# Patient Record
Sex: FEMALE | Race: WHITE | NOT HISPANIC OR LATINO | Employment: FULL TIME | URBAN - METROPOLITAN AREA
[De-identification: names, ages, dates, MRNs, and addresses within clinical notes are randomized per-mention and may not be internally consistent; named-entity substitution may affect disease eponyms.]

---

## 2017-04-18 PROBLEM — D49.2 NEOPLASM OF UNSPECIFIED BEHAVIOR OF BONE, SOFT TISSUE, AND SKIN: Status: RESOLVED | Noted: 2017-04-04 | Resolved: 2017-04-18

## 2017-04-19 ENCOUNTER — APPOINTMENT (OUTPATIENT)
Dept: RADIOLOGY | Facility: MEDICAL CENTER | Age: 67
End: 2017-04-19
Attending: EMERGENCY MEDICINE
Payer: MEDICARE

## 2017-04-19 ENCOUNTER — RESOLUTE PROFESSIONAL BILLING HOSPITAL PROF FEE (OUTPATIENT)
Dept: HOSPITALIST | Facility: MEDICAL CENTER | Age: 67
End: 2017-04-19
Payer: MEDICARE

## 2017-04-19 ENCOUNTER — HOSPITAL ENCOUNTER (OUTPATIENT)
Facility: MEDICAL CENTER | Age: 67
End: 2017-04-20
Attending: EMERGENCY MEDICINE | Admitting: INTERNAL MEDICINE
Payer: MEDICARE

## 2017-04-19 DIAGNOSIS — R07.9 CHEST PAIN, UNSPECIFIED TYPE: ICD-10-CM

## 2017-04-19 LAB
ALBUMIN SERPL BCP-MCNC: 4.4 G/DL (ref 3.2–4.9)
ALBUMIN/GLOB SERPL: 1.8 G/DL
ALP SERPL-CCNC: 57 U/L (ref 30–99)
ALT SERPL-CCNC: 9 U/L (ref 2–50)
ANION GAP SERPL CALC-SCNC: 7 MMOL/L (ref 0–11.9)
APTT PPP: 28.1 SEC (ref 24.7–36)
AST SERPL-CCNC: 12 U/L (ref 12–45)
BASOPHILS # BLD AUTO: 1.2 % (ref 0–1.8)
BASOPHILS # BLD: 0.06 K/UL (ref 0–0.12)
BILIRUB SERPL-MCNC: 0.3 MG/DL (ref 0.1–1.5)
BNP SERPL-MCNC: 40 PG/ML (ref 0–100)
BUN SERPL-MCNC: 22 MG/DL (ref 8–22)
CALCIUM SERPL-MCNC: 9.8 MG/DL (ref 8.5–10.5)
CHLORIDE SERPL-SCNC: 107 MMOL/L (ref 96–112)
CO2 SERPL-SCNC: 23 MMOL/L (ref 20–33)
CREAT SERPL-MCNC: 0.69 MG/DL (ref 0.5–1.4)
EKG IMPRESSION: NORMAL
EOSINOPHIL # BLD AUTO: 0.09 K/UL (ref 0–0.51)
EOSINOPHIL NFR BLD: 1.9 % (ref 0–6.9)
ERYTHROCYTE [DISTWIDTH] IN BLOOD BY AUTOMATED COUNT: 40.8 FL (ref 35.9–50)
GFR SERPL CREATININE-BSD FRML MDRD: >60 ML/MIN/1.73 M 2
GLOBULIN SER CALC-MCNC: 2.5 G/DL (ref 1.9–3.5)
GLUCOSE SERPL-MCNC: 87 MG/DL (ref 65–99)
HCT VFR BLD AUTO: 42.8 % (ref 37–47)
HGB BLD-MCNC: 14.1 G/DL (ref 12–16)
IMM GRANULOCYTES # BLD AUTO: 0.01 K/UL (ref 0–0.11)
IMM GRANULOCYTES NFR BLD AUTO: 0.2 % (ref 0–0.9)
INR PPP: 0.9 (ref 0.87–1.13)
LIPASE SERPL-CCNC: 31 U/L (ref 11–82)
LYMPHOCYTES # BLD AUTO: 1.28 K/UL (ref 1–4.8)
LYMPHOCYTES NFR BLD: 26.6 % (ref 22–41)
MCH RBC QN AUTO: 28.8 PG (ref 27–33)
MCHC RBC AUTO-ENTMCNC: 32.9 G/DL (ref 33.6–35)
MCV RBC AUTO: 87.3 FL (ref 81.4–97.8)
MONOCYTES # BLD AUTO: 0.32 K/UL (ref 0–0.85)
MONOCYTES NFR BLD AUTO: 6.6 % (ref 0–13.4)
NEUTROPHILS # BLD AUTO: 3.06 K/UL (ref 2–7.15)
NEUTROPHILS NFR BLD: 63.5 % (ref 44–72)
NRBC # BLD AUTO: 0 K/UL
NRBC BLD AUTO-RTO: 0 /100 WBC
PLATELET # BLD AUTO: 190 K/UL (ref 164–446)
PMV BLD AUTO: 10.6 FL (ref 9–12.9)
POTASSIUM SERPL-SCNC: 4.3 MMOL/L (ref 3.6–5.5)
PROT SERPL-MCNC: 6.9 G/DL (ref 6–8.2)
PROTHROMBIN TIME: 12.4 SEC (ref 12–14.6)
RBC # BLD AUTO: 4.9 M/UL (ref 4.2–5.4)
SODIUM SERPL-SCNC: 137 MMOL/L (ref 135–145)
T4 FREE SERPL-MCNC: 1.24 NG/DL (ref 0.53–1.43)
TROPONIN I SERPL-MCNC: <0.01 NG/ML (ref 0–0.04)
TROPONIN I SERPL-MCNC: <0.01 NG/ML (ref 0–0.04)
TSH SERPL DL<=0.005 MIU/L-ACNC: 1.61 UIU/ML (ref 0.3–3.7)
WBC # BLD AUTO: 4.8 K/UL (ref 4.8–10.8)

## 2017-04-19 PROCEDURE — 84443 ASSAY THYROID STIM HORMONE: CPT

## 2017-04-19 PROCEDURE — A9270 NON-COVERED ITEM OR SERVICE: HCPCS | Performed by: INTERNAL MEDICINE

## 2017-04-19 PROCEDURE — 71010 DX-CHEST-LIMITED (1 VIEW): CPT

## 2017-04-19 PROCEDURE — 83880 ASSAY OF NATRIURETIC PEPTIDE: CPT

## 2017-04-19 PROCEDURE — 85730 THROMBOPLASTIN TIME PARTIAL: CPT

## 2017-04-19 PROCEDURE — 93005 ELECTROCARDIOGRAM TRACING: CPT

## 2017-04-19 PROCEDURE — G0378 HOSPITAL OBSERVATION PER HR: HCPCS

## 2017-04-19 PROCEDURE — 99220 PR INITIAL OBSERVATION CARE,LEVL III: CPT | Performed by: INTERNAL MEDICINE

## 2017-04-19 PROCEDURE — 93005 ELECTROCARDIOGRAM TRACING: CPT | Performed by: INTERNAL MEDICINE

## 2017-04-19 PROCEDURE — 80053 COMPREHEN METABOLIC PANEL: CPT

## 2017-04-19 PROCEDURE — 36415 COLL VENOUS BLD VENIPUNCTURE: CPT

## 2017-04-19 PROCEDURE — 84484 ASSAY OF TROPONIN QUANT: CPT

## 2017-04-19 PROCEDURE — 99285 EMERGENCY DEPT VISIT HI MDM: CPT

## 2017-04-19 PROCEDURE — 85610 PROTHROMBIN TIME: CPT

## 2017-04-19 PROCEDURE — 93010 ELECTROCARDIOGRAM REPORT: CPT | Performed by: INTERNAL MEDICINE

## 2017-04-19 PROCEDURE — 84439 ASSAY OF FREE THYROXINE: CPT

## 2017-04-19 PROCEDURE — 85025 COMPLETE CBC W/AUTO DIFF WBC: CPT

## 2017-04-19 PROCEDURE — 700102 HCHG RX REV CODE 250 W/ 637 OVERRIDE(OP): Performed by: INTERNAL MEDICINE

## 2017-04-19 PROCEDURE — 83690 ASSAY OF LIPASE: CPT

## 2017-04-19 RX ORDER — BISACODYL 10 MG
10 SUPPOSITORY, RECTAL RECTAL
Status: DISCONTINUED | OUTPATIENT
Start: 2017-04-19 | End: 2017-04-20 | Stop reason: HOSPADM

## 2017-04-19 RX ORDER — LEVOTHYROXINE SODIUM 0.07 MG/1
75 TABLET ORAL
Status: DISCONTINUED | OUTPATIENT
Start: 2017-04-20 | End: 2017-04-20 | Stop reason: HOSPADM

## 2017-04-19 RX ORDER — NITROGLYCERIN 0.4 MG/1
0.4 TABLET SUBLINGUAL
Status: DISCONTINUED | OUTPATIENT
Start: 2017-04-19 | End: 2017-04-20 | Stop reason: HOSPADM

## 2017-04-19 RX ORDER — POLYETHYLENE GLYCOL 3350 17 G/17G
1 POWDER, FOR SOLUTION ORAL
Status: DISCONTINUED | OUTPATIENT
Start: 2017-04-19 | End: 2017-04-20 | Stop reason: HOSPADM

## 2017-04-19 RX ORDER — AMOXICILLIN 250 MG
2 CAPSULE ORAL 2 TIMES DAILY
Status: DISCONTINUED | OUTPATIENT
Start: 2017-04-19 | End: 2017-04-20 | Stop reason: HOSPADM

## 2017-04-19 RX ORDER — ATORVASTATIN CALCIUM 40 MG/1
40 TABLET, FILM COATED ORAL EVERY EVENING
Status: DISCONTINUED | OUTPATIENT
Start: 2017-04-19 | End: 2017-04-20 | Stop reason: HOSPADM

## 2017-04-19 RX ORDER — ACETAMINOPHEN 325 MG/1
650 TABLET ORAL EVERY 6 HOURS PRN
Status: DISCONTINUED | OUTPATIENT
Start: 2017-04-19 | End: 2017-04-20 | Stop reason: HOSPADM

## 2017-04-19 RX ADMIN — ATORVASTATIN CALCIUM 40 MG: 40 TABLET, FILM COATED ORAL at 20:59

## 2017-04-19 RX ADMIN — METOPROLOL TARTRATE 12.5 MG: 25 TABLET, FILM COATED ORAL at 20:59

## 2017-04-19 ASSESSMENT — LIFESTYLE VARIABLES
CONSUMPTION TOTAL: NEGATIVE
TOTAL SCORE: 0
HOW MANY TIMES IN THE PAST YEAR HAVE YOU HAD 5 OR MORE DRINKS IN A DAY: 0
HAVE PEOPLE ANNOYED YOU BY CRITICIZING YOUR DRINKING: NO
ALCOHOL_USE: YES
AVERAGE NUMBER OF DAYS PER WEEK YOU HAVE A DRINK CONTAINING ALCOHOL: 1
HAVE YOU EVER FELT YOU SHOULD CUT DOWN ON YOUR DRINKING: NO
ON A TYPICAL DAY WHEN YOU DRINK ALCOHOL HOW MANY DRINKS DO YOU HAVE: 1
TOTAL SCORE: 0
EVER FELT BAD OR GUILTY ABOUT YOUR DRINKING: NO
EVER HAD A DRINK FIRST THING IN THE MORNING TO STEADY YOUR NERVES TO GET RID OF A HANGOVER: NO
EVER_SMOKED: YES
TOTAL SCORE: 0

## 2017-04-19 ASSESSMENT — PAIN SCALES - GENERAL
PAINLEVEL_OUTOF10: 0
PAINLEVEL_OUTOF10: 0

## 2017-04-19 NOTE — ED NOTES
.  Chief Complaint   Patient presents with   • Chest Pain     last night 0530 lasting at ot 30 minutes.    • Shortness of Breath   • Numbness     left hand   • Tired   ambulated to triage with daughter. Above c/c. ekg complete prior to triage.

## 2017-04-19 NOTE — IP AVS SNAPSHOT
" Home Care Instructions                                                                                                                  Name:Sinai Hoover  Medical Record Number:[id number]  CSN: [id number]    YOB: 1950   Age: 67 y.o.  Sex: female  HT:1.727 m (5' 8\") WT: 65.7 kg (144 lb 13.5 oz)          Admit Date: 4/19/2017     Discharge Date:   Today's Date: 4/20/2017  Attending Doctor:  Florian Campbell M.D.                  Allergies:  Celebrex            Discharge Instructions       Discharge Instructions    Discharged to home by car with relative. Discharged via walking, hospital escort: Refused.  Special equipment needed: Not Applicable    Be sure to schedule a follow-up appointment with your primary care doctor or any specialists as instructed.     Discharge Plan:   Diet Plan: Discussed  Activity Level: Discussed  Confirmed Follow up Appointment: Patient to Call and Schedule Appointment  Confirmed Symptoms Management: Discussed  Medication Reconciliation Updated: Yes  Influenza Vaccine Indication: Patient Refuses    I understand that a diet low in cholesterol, fat, and sodium is recommended for good health. Unless I have been given specific instructions below for another diet, I accept this instruction as my diet prescription.   Other diet: heart healthy    Special Instructions: None    · Is patient discharged on Warfarin / Coumadin?   No     · Is patient Post Blood Transfusion?  No        Discharge to home.   See Ailyn Jefferson in 1 week.    Heart healthy diet.            Depression / Suicide Risk    As you are discharged from this [facility] facility, it is important to learn how to keep safe from harming yourself.    Recognize the warning signs:  · Abrupt changes in personality, positive or negative- including increase in energy   · Giving away possessions  · Change in eating patterns- significant weight changes-  positive or negative  · Change in sleeping patterns- unable to sleep or sleeping all " the time   · Unwillingness or inability to communicate  · Depression  · Unusual sadness, discouragement and loneliness  · Talk of wanting to die  · Neglect of personal appearance   · Rebelliousness- reckless behavior  · Withdrawal from people/activities they love  · Confusion- inability to concentrate     If you or a loved one observes any of these behaviors or has concerns about self-harm, here's what you can do:  · Talk about it- your feelings and reasons for harming yourself  · Remove any means that you might use to hurt yourself (examples: pills, rope, extension cords, firearm)  · Get professional help from the community (Mental Health, Substance Abuse, psychological counseling)  · Do not be alone:Call your Safe Contact- someone whom you trust who will be there for you.  · Call your local CRISIS HOTLINE 885-7669 or 767-723-3046  · Call your local Children's Mobile Crisis Response Team [place] (707) 609-3587 or www.[employer]  · Call the toll free National Suicide Prevention Hotlines   · National Suicide Prevention Lifeline 639-257-GTMP (2134)  · [place] Hope Line Network 800-SUICIDE (118-8769)           Discharge Medication Instructions:    Below are the medications your physician expects you to take upon discharge:    Review all your home medications and newly ordered medications with your doctor and/or pharmacist. Follow medication instructions as directed by your doctor and/or pharmacist.    Please keep your medication list with you and share with your physician.               Medication List      CHANGE how you take these medications        Instructions    Morning Afternoon Evening Bedtime    levothyroxine 75 MCG Tabs   What changed:  Another medication with the same name was removed. Continue taking this medication, and follow the directions you see here.   Last time this was given:  75 mcg on 4/20/2017  6:00 AM   Commonly known as:  SYNTHROID        TAKE 1 TAB BY MOUTH EVERYDAY.                                   Orders for after discharge     REFERRAL TO INTENSIVE CARDIAC REHAB/CARDIAC REHAB    Complete by:  As directed    Qualifying Diagnosis for Cardiac Rehab:    -Myocardial Infarction  -Old Myocardial Infarction  -Coronary Artery Bypass Surgery  -Stable Angina Pectoris  -PTCA Status with or without Stents  -Heart Valve Replaced by other means  -Heart Transplant   -Aneurysm Repair    Provider Exercise Prescription for Treatment Plan:  -Outpatient Cardiac Rehab (CR)/Intensive Cardiac Rehab (ICR), duration based on patient progress 1-3 times per week up to a total of 36/72 sessions over a period of up to 18/36 weeks    -Progressive interval exercise training for 20-45 minutes, 1-3 times per week in Cardiac Rehab utilizing Treadmill, Elliptical, Stationary Cycling, Arm Ergometer, other conditioning activities and appropriate home program supplement    -Light resistance training 2-4 weeks post PTCA, 2-4 weeks post MI, or 4-6 weeks post CABG, 4-6 weeks post aneurysm repair (20 # max)    -% TARGET HEART RATE OR MET’s:        RPE of 11-16 on a scale of 6-20       GXT Data:  40% - 80% exercise capacity using %HR max       HR below ischemic threshold (if determined, HR restriction)    -Education to promote an active healthy lifestyle and reduction of personal health risk factors    -Follow [facility] Policies and Procedures for Phase II CR/ICR             Instructions           Diet / Nutrition:    Follow any diet instructions given to you by your doctor or the dietician, including how much salt (sodium) you are allowed each day.    If you are overweight, talk to your doctor about a weight reduction plan.    Activity:    Remain physically active following your doctor's instructions about exercise and activity.    Rest often.     Any time you become even a little tired or short of breath, SIT DOWN and rest.    Worsening Symptoms:    Report any of the following signs and symptoms to the doctor's office  immediately:    *Pain of jaw, arm, or neck  *Chest pain not relieved by medication                               *Dizziness or loss of consciousness  *Difficulty breathing even when at rest   *More tired than usual                                       *Bleeding drainage or swelling of surgical site  *Swelling of feet, ankles, legs or stomach                 *Fever (>100ºF)  *Pink or blood tinged sputum  *Weight gain (3lbs/day or 5lbs /week)           *Shock from internal defibrillator (if applicable)  *Palpitations or irregular heartbeats                *Cool and/or numb extremities    Stroke Awareness    Common Risk Factors for Stroke include:    Age  Atrial Fibrillation  Carotid Artery Stenosis  Diabetes Mellitus  Excessive alcohol consumption  High blood pressure  Overweight   Physical inactivity  Smoking    Warning signs and symptoms of a stroke include:    *Sudden numbness or weakness of the face, arm or leg (especially on one side of the body).  *Sudden confusion, trouble speaking or understanding.  *Sudden trouble seeing in one or both eyes.  *Sudden trouble walking, dizziness, loss of balance or coordination.Sudden severe headache with no known cause.    It is very important to get treatment quickly when a stroke occurs. If you experience any of the above warning signs, call 911 immediately.                   Disclaimer         Quit Smoking / Tobacco Use:    I understand the use of any tobacco products increases my chance of suffering from future heart disease or stroke and could cause other illnesses which may shorten my life. Quitting the use of tobacco products is the single most important thing I can do to improve my health. For further information on smoking / tobacco cessation call a Toll Free Quit Line at 1-337.325.2428 (*National Cancer [place]) or 1-559.906.9105 (American Lung Association) or you can access the web based program at www.lungusa.org.    [place] Tobacco Users Help Line:  (015)  874-6092       Toll Free: 0-331-345-0436  Quit Tobacco Program [facility] (155)[phone]    Crisis Hotline:    [place] Crisis Hotline:  7-996-JWVYYRD or 1-948.272.9170    [place] Crisis Hotline:    1-341.138.6537 or 178-461-1668    Discharge Survey:   Thank you for choosing [facility]. We hope we did everything we could to make your hospital stay a pleasant one. You may be receiving a phone survey and we would appreciate your time and participation in answering the questions. Your input is very valuable to us in our efforts to improve our service to our patients and their families.        My signature on this form indicates that:    1. I have reviewed and understand the above information.  2. My questions regarding this information have been answered to my satisfaction.  3. I have formulated a plan with my discharge nurse to obtain my prescribed medications for home.                  Disclaimer         __________________________________                     __________       ________                       Patient Signature                                                 Date                    Time

## 2017-04-20 ENCOUNTER — HOSPITAL ENCOUNTER (OUTPATIENT)
Dept: RADIOLOGY | Facility: MEDICAL CENTER | Age: 67
End: 2017-04-20
Attending: INTERNAL MEDICINE
Payer: MEDICARE

## 2017-04-20 VITALS
SYSTOLIC BLOOD PRESSURE: 110 MMHG | TEMPERATURE: 98.3 F | HEART RATE: 65 BPM | DIASTOLIC BLOOD PRESSURE: 58 MMHG | HEIGHT: 68 IN | BODY MASS INDEX: 21.95 KG/M2 | WEIGHT: 144.84 LBS | OXYGEN SATURATION: 96 % | RESPIRATION RATE: 16 BRPM

## 2017-04-20 LAB
ANION GAP SERPL CALC-SCNC: 8 MMOL/L (ref 0–11.9)
BASOPHILS # BLD AUTO: 0.8 % (ref 0–1.8)
BASOPHILS # BLD: 0.04 K/UL (ref 0–0.12)
BUN SERPL-MCNC: 18 MG/DL (ref 8–22)
CALCIUM SERPL-MCNC: 9.3 MG/DL (ref 8.5–10.5)
CHLORIDE SERPL-SCNC: 106 MMOL/L (ref 96–112)
CO2 SERPL-SCNC: 26 MMOL/L (ref 20–33)
CREAT SERPL-MCNC: 0.74 MG/DL (ref 0.5–1.4)
EKG IMPRESSION: NORMAL
EKG IMPRESSION: NORMAL
EOSINOPHIL # BLD AUTO: 0.09 K/UL (ref 0–0.51)
EOSINOPHIL NFR BLD: 1.8 % (ref 0–6.9)
ERYTHROCYTE [DISTWIDTH] IN BLOOD BY AUTOMATED COUNT: 41.1 FL (ref 35.9–50)
GFR SERPL CREATININE-BSD FRML MDRD: >60 ML/MIN/1.73 M 2
GLUCOSE SERPL-MCNC: 107 MG/DL (ref 65–99)
HCT VFR BLD AUTO: 39.5 % (ref 37–47)
HGB BLD-MCNC: 13.3 G/DL (ref 12–16)
IMM GRANULOCYTES # BLD AUTO: 0.01 K/UL (ref 0–0.11)
IMM GRANULOCYTES NFR BLD AUTO: 0.2 % (ref 0–0.9)
LYMPHOCYTES # BLD AUTO: 1.42 K/UL (ref 1–4.8)
LYMPHOCYTES NFR BLD: 29.2 % (ref 22–41)
MCH RBC QN AUTO: 29.2 PG (ref 27–33)
MCHC RBC AUTO-ENTMCNC: 33.7 G/DL (ref 33.6–35)
MCV RBC AUTO: 86.6 FL (ref 81.4–97.8)
MONOCYTES # BLD AUTO: 0.46 K/UL (ref 0–0.85)
MONOCYTES NFR BLD AUTO: 9.4 % (ref 0–13.4)
NEUTROPHILS # BLD AUTO: 2.85 K/UL (ref 2–7.15)
NEUTROPHILS NFR BLD: 58.6 % (ref 44–72)
NRBC # BLD AUTO: 0 K/UL
NRBC BLD AUTO-RTO: 0 /100 WBC
PLATELET # BLD AUTO: 182 K/UL (ref 164–446)
PMV BLD AUTO: 10.2 FL (ref 9–12.9)
POTASSIUM SERPL-SCNC: 3.3 MMOL/L (ref 3.6–5.5)
RBC # BLD AUTO: 4.56 M/UL (ref 4.2–5.4)
SODIUM SERPL-SCNC: 140 MMOL/L (ref 135–145)
TROPONIN I SERPL-MCNC: <0.01 NG/ML (ref 0–0.04)
WBC # BLD AUTO: 4.9 K/UL (ref 4.8–10.8)

## 2017-04-20 PROCEDURE — 93010 ELECTROCARDIOGRAM REPORT: CPT | Performed by: INTERNAL MEDICINE

## 2017-04-20 PROCEDURE — 36415 COLL VENOUS BLD VENIPUNCTURE: CPT

## 2017-04-20 PROCEDURE — 80048 BASIC METABOLIC PNL TOTAL CA: CPT

## 2017-04-20 PROCEDURE — A9270 NON-COVERED ITEM OR SERVICE: HCPCS | Performed by: NURSE PRACTITIONER

## 2017-04-20 PROCEDURE — 70551 MRI BRAIN STEM W/O DYE: CPT

## 2017-04-20 PROCEDURE — 700102 HCHG RX REV CODE 250 W/ 637 OVERRIDE(OP): Performed by: NURSE PRACTITIONER

## 2017-04-20 PROCEDURE — 84484 ASSAY OF TROPONIN QUANT: CPT

## 2017-04-20 PROCEDURE — G0378 HOSPITAL OBSERVATION PER HR: HCPCS

## 2017-04-20 PROCEDURE — 99217 PR OBSERVATION CARE DISCHARGE: CPT | Performed by: INTERNAL MEDICINE

## 2017-04-20 PROCEDURE — A9270 NON-COVERED ITEM OR SERVICE: HCPCS | Performed by: INTERNAL MEDICINE

## 2017-04-20 PROCEDURE — A9502 TC99M TETROFOSMIN: HCPCS

## 2017-04-20 PROCEDURE — 700111 HCHG RX REV CODE 636 W/ 250 OVERRIDE (IP)

## 2017-04-20 PROCEDURE — 85025 COMPLETE CBC W/AUTO DIFF WBC: CPT

## 2017-04-20 PROCEDURE — 93005 ELECTROCARDIOGRAM TRACING: CPT | Performed by: INTERNAL MEDICINE

## 2017-04-20 PROCEDURE — 700102 HCHG RX REV CODE 250 W/ 637 OVERRIDE(OP): Performed by: INTERNAL MEDICINE

## 2017-04-20 RX ORDER — REGADENOSON 0.08 MG/ML
INJECTION, SOLUTION INTRAVENOUS
Status: COMPLETED
Start: 2017-04-20 | End: 2017-04-20

## 2017-04-20 RX ORDER — POTASSIUM CHLORIDE 20 MEQ/1
40 TABLET, EXTENDED RELEASE ORAL ONCE
Status: COMPLETED | OUTPATIENT
Start: 2017-04-20 | End: 2017-04-20

## 2017-04-20 RX ADMIN — REGADENOSON 0.4 MG: 0.08 INJECTION, SOLUTION INTRAVENOUS at 10:12

## 2017-04-20 RX ADMIN — LEVOTHYROXINE SODIUM 75 MCG: 75 TABLET ORAL at 06:00

## 2017-04-20 RX ADMIN — ASPIRIN 81 MG: 81 TABLET ORAL at 11:27

## 2017-04-20 RX ADMIN — POTASSIUM CHLORIDE 40 MEQ: 1500 TABLET, EXTENDED RELEASE ORAL at 11:27

## 2017-04-20 ASSESSMENT — PAIN SCALES - GENERAL: PAINLEVEL_OUTOF10: 0

## 2017-04-20 NOTE — PROGRESS NOTES
Assessment complete. A&Ox4. C/o pain 0/10. Will be medicated per MAR prn. Discussed POC w/ pt. Educated on medications per MAR. Discussed the importance of calling for needs or if pt has increasing CP. Pt verbalizes understanding. Call light in reach. Bed in lowest position. No other needs identified. Will continue hourly rounding.

## 2017-04-20 NOTE — PROGRESS NOTES
Discussed w/ Dr. Bowers about MRi orders and pt being tele monitored. Per Dr. Elissa tavares for pt to be off tele for MRI.

## 2017-04-20 NOTE — PROGRESS NOTES
Sunny Rosario, daughter requests to be updated on pt status especially in case of emergency [phone]

## 2017-04-20 NOTE — PROGRESS NOTES
Pt has new order for MRI brain. Pt verbalizes she has his replacement. MRI tech Cony notified. Pt can still have an MRI per tech.

## 2017-04-20 NOTE — DISCHARGE INSTRUCTIONS
Discharge Instructions    Discharged to home by car with relative. Discharged via walking, hospital escort: Refused.  Special equipment needed: Not Applicable    Be sure to schedule a follow-up appointment with your primary care doctor or any specialists as instructed.     Discharge Plan:   Diet Plan: Discussed  Activity Level: Discussed  Confirmed Follow up Appointment: Patient to Call and Schedule Appointment  Confirmed Symptoms Management: Discussed  Medication Reconciliation Updated: Yes  Influenza Vaccine Indication: Patient Refuses    I understand that a diet low in cholesterol, fat, and sodium is recommended for good health. Unless I have been given specific instructions below for another diet, I accept this instruction as my diet prescription.   Other diet: heart healthy    Special Instructions: None    · Is patient discharged on Warfarin / Coumadin?   No     · Is patient Post Blood Transfusion?  No        Discharge to home.   See Ailyn Jefferson in 1 week.    Heart healthy diet.            Depression / Suicide Risk    As you are discharged from this [facility] facility, it is important to learn how to keep safe from harming yourself.    Recognize the warning signs:  · Abrupt changes in personality, positive or negative- including increase in energy   · Giving away possessions  · Change in eating patterns- significant weight changes-  positive or negative  · Change in sleeping patterns- unable to sleep or sleeping all the time   · Unwillingness or inability to communicate  · Depression  · Unusual sadness, discouragement and loneliness  · Talk of wanting to die  · Neglect of personal appearance   · Rebelliousness- reckless behavior  · Withdrawal from people/activities they love  · Confusion- inability to concentrate     If you or a loved one observes any of these behaviors or has concerns about self-harm, here's what you can do:  · Talk about it- your feelings and reasons for harming yourself  · Remove any means  that you might use to hurt yourself (examples: pills, rope, extension cords, firearm)  · Get professional help from the community (Mental Health, Substance Abuse, psychological counseling)  · Do not be alone:Call your Safe Contact- someone whom you trust who will be there for you.  · Call your local CRISIS HOTLINE 184-5175 or 410-788-2087  · Call your local Children's Mobile Crisis Response Team [place] (633) 148-3957 or www.[employer]  · Call the toll free National Suicide Prevention Hotlines   · National Suicide Prevention Lifeline 112-386-ACJO (8655)  · National Hope Line Network 800-SUICIDE (908-4252)

## 2017-04-20 NOTE — PROGRESS NOTES
Pt returned from MRI w/ transport. Pt re-connected to tele monitor and resting comfortably in bed.

## 2017-04-20 NOTE — PROGRESS NOTES
Assessment completed. Pt A&O. Respirations are even and unlabored on RA. POC updated. Pt educated on room and call light, pt verbalized understanding. Pt denies pain at this time. Denies N/V. Monitors applied, VS stable, call light within reach. Needs met. NPO for stress test.

## 2017-04-20 NOTE — DISCHARGE PLANNING
Care Transition Team Assessment    Information Source  Orientation : Oriented x 4  Information Given By: Patient         Elopement Risk  Legal Hold: No  Ambulatory or Self Mobile in Wheelchair: Yes  Disoriented: No  Psychiatric Symptoms: None  History of Wandering: No  Elopement this Admit: No  Vocalizing Wanting to Leave: No  Displays Behaviors, Body Language Wanting to Leave: No-Not at Risk for Elopement  Elopement Risk: Not at Risk for Elopement    Interdisciplinary Discharge Planning  Does Admitting Nurse Feel This Could be a Complex Discharge?: No  Primary Care Physician: Dr. Dolores Ogden  Lives with - Patient's Self Care Capacity: Spouse  Patient or legal guardian wants to designate a caregiver (see row info): No  Support Systems: Family Member(s), Friends / Neighbors  Housing / Facility: [address]  Do You Take your Prescribed Medications Regularly: Yes  Able to Return to Previous ADL's: No  Mobility Issues: No  Prior Services: None  Patient Expects to be Discharged to:: home  Assistance Needed: No  Durable Medical Equipment: Not Applicable                   Vision / Hearing Impairment  Vision Impairment : No  Hearing Impairment : No    Values / Beliefs / Concerns  Values / Beliefs Concerns : No         Domestic Abuse  Have you ever been the victim of abuse or violence?: No  Physical Abuse or Sexual Abuse: No  Verbal Abuse or Emotional Abuse: No  Possible Abuse Reported to:: Not Applicable

## 2017-04-20 NOTE — PROGRESS NOTES
IV Dc'd. Discharge instructions provided to patient. Pt verbalizes understanding. Pt states all questions have been answered. Copy of DC provided to patient. Signed copy in chart. Pt states all personal belongings are in possession.  Pt escorted off unit by this RN without incident.

## 2017-04-20 NOTE — PROGRESS NOTES
Pt arrived via meño Dominique, supervisor of CDU RN. Pt ambulated from [facility] to bed w/o assistance. Pt resting comfortably in bed. Tele monitor placed.

## 2017-04-20 NOTE — ED PROVIDER NOTES
ED Provider Note    CHIEF COMPLAINT  Chief Complaint   Patient presents with   • Chest Pain     last night 0530 lasting at ot 30 minutes.    • Shortness of Breath   • Numbness     left hand   • Tired       HPI  Sinai Hoover is a 67 y.o. female who presents complaining of some chest discomfort. This morning in bed she had a episode of substernal chest pain. Did not radiate. Nothing made it better or worse. Associate with that shortness of breath and numbness in her left arm. She's had quite a bit of exhaustion of late to have associated feeling of this as well. She's had some issues with her memory of late to, for example she cannot recall her code for the garage. About 10 days ago she had an episode of flashing lights in her vision. This short-lived. She denies any headache. Presently has no chest pain, lasted for perhaps half an hour. No cough or cold symptoms. No fever or chills. No leg pain or swelling. Typically she is quite healthy and exercises on a regular basis. She denies any exertional symptoms at all. She has no history of tobacco use, diabetes hypertension. Her father it sounds a cardiovascular disease and cerebral vascular disease dying in his early 50s. There is no other complaint.    PAST MEDICAL HISTORY  Past Medical History   Diagnosis Date   • Hypothyroidism        FAMILY HISTORY  Family History   Problem Relation Age of Onset   • Alcohol/Drug Mother    • Heart Disease Father    • Alcohol/Drug Brother        SOCIAL HISTORY  Social History   Substance Use Topics   • Smoking status: Never Smoker    • Smokeless tobacco: None   • Alcohol Use: Yes      Comment: rare         SURGICAL HISTORY  Past Surgical History   Procedure Laterality Date   • Colonoscopy  2001, 2009     recheck age 64y   • Meniscectomy  1994   • Us-needle core bx-breast panel    2004 ?      left breast, benign       CURRENT MEDICATIONS  Home Medications     Reviewed by Merced Irizarry R.N. (Registered Nurse) on 04/19/17 at  "1844  Med List Status: Complete    Medication Last Dose Status    levothyroxine (SYNTHROID) 75 MCG TABS  Active    levothyroxine (SYNTHROID) 88 MCG TABS  Active                I have reviewed the nurses notes and/or the list brought with the patient.    ALLERGIES  Allergies   Allergen Reactions   • Celebrex [Celecoxib] Nausea     vertigo       REVIEW OF SYSTEMS  See HPI for further details. Review of systems as above, otherwise all other systems are negative.     PHYSICAL EXAM  VITAL SIGNS: /50 mmHg  Pulse 75  Temp(Src) 36.6 °C (97.9 °F) (Temporal)  Resp 11  Ht 1.727 m (5' 8\")  Wt 67 kg (147 lb 11.3 oz)  BMI 22.46 kg/m2  SpO2 99%  Constitutional: Well appearing patient in no acute distress.  Not toxic, nor ill in appearance.  HENT: Mucus membranes moist.  Oropharynx is clear.  Eyes: Pupils equally round.  No scleral icterus.   Neck: Full nontender range of motion.  Lymphatic: No cervical lymphadenopathy noted.   Cardiovascular: Regular heart rate and rhythm.  No murmurs, rubs, nor gallop appreciated.   Thorax & Lungs: Chest is nontender.  Lungs are clear to auscultation with good air movement bilaterally.  No wheeze, rhonchi, nor rales.   Abdomen: Soft, with no tenderness, rebound nor guarding.  No mass, pulsatile mass, nor hepatosplenomegaly appreciated.  Skin: No purpura nor petechia noted.  Extremities/Musculoskeletal: No sign of trauma.  Calves are nontender with no cords nor edema.  No Rosalio's sign.  Pulses are intact all around.   Neurologic: Alert & oriented.  Strength and sensation is intact all around.  Gait is normal.  Psychiatric: Normal affect appropriate for the clinical situation.    EKG  I interpreted this EKG myself.  This is a 12-lead study.  The rhythm is sinus with a rate of 69.  There are nonspecific ST-T wave abnormalities.  Interpretation: No ST segment elevation myocardial infarction.    LABS  Labs Reviewed   CBC WITH DIFFERENTIAL - Abnormal; Notable for the following:     [facility] " 32.9 (*)     All other components within normal limits    Narrative:     Indicate which anticoagulants the patient is on:->UNKNOWN   TROPONIN    Narrative:     Indicate which anticoagulants the patient is on:->UNKNOWN   BTYPE NATRIURETIC PEPTIDE    Narrative:     Indicate which anticoagulants the patient is on:->UNKNOWN   COMP METABOLIC PANEL    Narrative:     Indicate which anticoagulants the patient is on:->UNKNOWN   PROTHROMBIN TIME    Narrative:     Indicate which anticoagulants the patient is on:->UNKNOWN   APTT    Narrative:     Indicate which anticoagulants the patient is on:->UNKNOWN   LIPASE    Narrative:     Indicate which anticoagulants the patient is on:->UNKNOWN   ESTIMATED GFR    Narrative:     Indicate which anticoagulants the patient is on:->UNKNOWN   TSH   FREE THYROXINE   TROPONIN         RADIOLOGY/PROCEDURES  I have reviewed the patient's film interpretations myself, and they are read out by the radiologist as:   DX-CHEST-LIMITED (1 VIEW)   Final Result      No evidence of acute cardiopulmonary process.      ECHOCARDIOGRAM COMP W/O CONT    (Results Pending)   NM-CARDIAC STRESS TEST    (Results Pending)     .    MEDICAL RECORD  I have reviewed patient's medical record and pertinent results are listed above.    COURSE & MEDICAL DECISION MAKING  I have reviewed any medical record information, laboratory studies and radiographic results as noted above.  This patient presents with some chest discomfort. She's not had this before. Her EKG shows nonspecific changes. Her 1st troponin is negative. Chest x-ray shows no evidence of an infiltrate. At this point will  Hospital for serial troponins and risk stratification. Of note we did check her thyroid tests as well, these are in the normal range. Case is discussed with the [facility]. She is admitted.      FINAL IMPRESSION  1. Chest pain, unspecified type           This dictation was created using voice recognition software.    Electronically signed  by: Tirso Garvey, 4/19/2017 8:21 PM

## 2017-04-20 NOTE — ED NOTES
Pt comes in due to non radiating chest pain. States new and sudden pain. Describes as constant, not throbbing or ripping. Thinks she is having a stroke or a heart attack. Pt states generalized weakness. No facial drop or motor drift. AAOx4. Pt states n/v/headache. States new workout routine which could be causing new pain for pain per patient. Pt ambulatory to room with daughter.

## 2017-04-20 NOTE — H&P
HOSPITAL MEDICINE HISTORY/ PHYSICAL    Date & Time note created:    4/19/2017   9:03 PM       Date & Time Patient was seen:   4/19/2017    Patient ID:   Name: Sinai Hoover. YOB: 1950. Age: 67 y.o. female. MRN: [id number]    Admitting Attending:  Radha Garcia     PCP : ALEX Gonzáles          Chief Complaint:       Chest pain    History of Present Illness:    Sinai Hoover is a 67 y.o. female who Hx of hypothyroidism presents complaining of chest pain.   Patient described the pain is local, 6-8/10, intermittent and does not radiate to other location, sharp   and with some tingling. Can be controlled by pain meds. Nothing made it better or worse. Associate   with that shortness of breath and numbness in her left arm. She's had quite a bit of exhaustion of   late to have associated feeling of this as well. She also says about 10 days ago she had an episode   of flashing lights in her vision. This short-lived. She denies any headache. Presently has no chest   pain, lasted for perhaps half an hour. No cough or cold symptoms. No fever or chills. No leg pain or   swelling. Typically she is quite healthy and exercises on a regular basis. She denies any exertional   symptoms at all. She has no history of tobacco use, diabetes hypertension. Her father it sounds a   cardiovascular disease and cerebral vascular disease dying in his early 50s. There is no other complaint.    Review of Systems:      per HPI otherwise 14 points reviewed 12 systems neg per AMA/CMS criteria.        Past Medical/ Family / Social history (PFSH):   Past Medical History   Diagnosis Date   • Hypothyroidism      Past Surgical History   Procedure Laterality Date   • Colonoscopy  2001, 2009     recheck age 64y   • Meniscectomy  1994   • Us-needle core bx-breast panel    2004 ?      left breast, benign     Current Outpatient Medications:  No current facility-administered medications on file prior to encounter.     Current Outpatient  "Prescriptions on File Prior to Encounter   Medication Sig Dispense Refill   • levothyroxine (SYNTHROID) 88 MCG TABS TAKE 1 TAB BY MOUTH EVERYDAY. 90 Tab 0   • levothyroxine (SYNTHROID) 75 MCG TABS TAKE 1 TAB BY MOUTH EVERYDAY. 90 Tab 0     Medication Allergy/Sensitivities:  Allergies   Allergen Reactions   • Celebrex [Celecoxib] Nausea     vertigo     Family History:  Family History   Problem Relation Age of Onset   • Alcohol/Drug Mother    • Heart Disease Father    • Alcohol/Drug Brother     reviewed and felt non pertinent to this encounter     Social History:  Social History   Substance Use Topics   • Smoking status: Never Smoker    • Smokeless tobacco: None   • Alcohol Use: Yes      Comment: rare     #################################################################  Physical Exam:   Vitals/ General Appearance:   Weight/BMI: Body mass index is 22.03 kg/(m^2).  Blood pressure 137/82, pulse 66, temperature 36.7 °C (98 °F), temperature source Temporal, resp. rate 18, height 1.727 m (5' 8\"), weight 65.7 kg (144 lb 13.5 oz), SpO2 97 %, not currently breastfeeding.   Filed Vitals:    04/19/17 0928 04/19/17 1343 04/19/17 1854 04/19/17 2036   BP:  113/50  137/82   Pulse:  83 75 66   Temp:    36.7 °C (98 °F)   TempSrc:       Resp:  16 11 18   Height:       Weight: 67 kg (147 lb 11.3 oz)   65.7 kg (144 lb 13.5 oz)   SpO2:  97% 99% 97%    Oxygen Therapy:  Pulse Oximetry: 97 %, O2 Delivery: None (Room Air)    Constitutional:  well developed, well nourished, non-toxic, no acute distress aao x4  HENMT: Normocephalic, atraumatic, b/l ears normal, nose normal  Eyes:  EOMI, conjunctiva normal, no discharge  Neck: no tracheal deviation, supple  Cardiovascular: normal heart rate, normal rhythm, no murmurs, no rubs or gallops; no cyanosis, clubbing or edema  Lungs: Respiratory effort is normal, normal breath sounds, breath sounds clear to auscultation b/l, no rales, rhonchi or wheezing  Abdomen: soft, non-tender, no guarding or " rebound, active BS, no mass  Skin: warm, dry, no erythema, no rash  Neurologic: Alert and oriented, strength 5/5, no focal deficits, CN II-XII normal  Psychiatric: No anxiety or depression    #################################################################  Lab Data Review:    Objective  Recent Results (from the past 24 hour(s))   EKG (NOW)    Collection Time: 17  9:20 AM   Result Value Ref Range    Report       [facility]    Test Date:  2017  Pt Name:    HOANG FAULKNER                   Department: ER  MRN:        [id number]                      Room:  Gender:     F                            Technician: [id number]  :        1950                   Requested By:ER TRIAGE PROTOCOL  Order #:    [id number]                    Reading MD:    Measurements  Intervals                                Axis  Rate:       69                           P:          73  NV:         148                          QRS:        64  QRSD:       80                           T:          12  QT:         424  QTc:        455    Interpretive Statements  SINUS RHYTHM  BORDERLINE T ABNORMALITIES, ANTERIOR LEADS  No previous ECG available for comparison     Troponin    Collection Time: 17 11:22 AM   Result Value Ref Range    Troponin I <0.01 0.00 - 0.04 ng/mL   Btype Natriuretic Peptide    Collection Time: 17 11:22 AM   Result Value Ref Range    B Natriuretic Peptide 40 0 - 100 pg/mL   CBC with Differential    Collection Time: 17 11:22 AM   Result Value Ref Range    WBC 4.8 4.8 - 10.8 K/uL    RBC 4.90 4.20 - 5.40 M/uL    Hemoglobin 14.1 12.0 - 16.0 g/dL    Hematocrit 42.8 37.0 - 47.0 %    MCV 87.3 81.4 - 97.8 fL    MCH 28.8 27.0 - 33.0 pg    MCHC 32.9 (L) 33.6 - 35.0 g/dL    RDW 40.8 35.9 - 50.0 fL    Platelet Count 190 164 - 446 K/uL    MPV 10.6 9.0 - 12.9 fL    Neutrophils-Polys 63.50 44.00 - 72.00 %    Lymphocytes 26.60 22.00 - 41.00 %    Monocytes 6.60 0.00 - 13.40 %    Eosinophils  1.90 0.00 - 6.90 %    Basophils 1.20 0.00 - 1.80 %    Immature Granulocytes 0.20 0.00 - 0.90 %    Nucleated RBC 0.00 /100 WBC    Neutrophils (Absolute) 3.06 2.00 - 7.15 K/uL    Lymphs (Absolute) 1.28 1.00 - 4.80 K/uL    Monos (Absolute) 0.32 0.00 - 0.85 K/uL    Eos (Absolute) 0.09 0.00 - 0.51 K/uL    Baso (Absolute) 0.06 0.00 - 0.12 K/uL    Immature Granulocytes (abs) 0.01 0.00 - 0.11 K/uL    NRBC (Absolute) 0.00 K/uL   Complete Metabolic Panel (CMP)    Collection Time: 04/19/17 11:22 AM   Result Value Ref Range    Sodium 137 135 - 145 mmol/L    Potassium 4.3 3.6 - 5.5 mmol/L    Chloride 107 96 - 112 mmol/L    Co2 23 20 - 33 mmol/L    Anion Gap 7.0 0.0 - 11.9    Glucose 87 65 - 99 mg/dL    Bun 22 8 - 22 mg/dL    Creatinine 0.69 0.50 - 1.40 mg/dL    Calcium 9.8 8.5 - 10.5 mg/dL    AST(SGOT) 12 12 - 45 U/L    ALT(SGPT) 9 2 - 50 U/L    Alkaline Phosphatase 57 30 - 99 U/L    Total Bilirubin 0.3 0.1 - 1.5 mg/dL    Albumin 4.4 3.2 - 4.9 g/dL    Total Protein 6.9 6.0 - 8.2 g/dL    Globulin 2.5 1.9 - 3.5 g/dL    A-G Ratio 1.8 g/dL   Prothrombin Time    Collection Time: 04/19/17 11:22 AM   Result Value Ref Range    PT 12.4 12.0 - 14.6 sec    INR 0.90 0.87 - 1.13   APTT    Collection Time: 04/19/17 11:22 AM   Result Value Ref Range    APTT 28.1 24.7 - 36.0 sec   Lipase    Collection Time: 04/19/17 11:22 AM   Result Value Ref Range    Lipase 31 11 - 82 U/L   ESTIMATED GFR    Collection Time: 04/19/17 11:22 AM   Result Value Ref Range    GFR If African American >60 >60 mL/min/1.73 m 2    GFR If Non African American >60 >60 mL/min/1.73 m 2   TSH    Collection Time: 04/19/17 11:22 AM   Result Value Ref Range    TSH 1.610 0.300 - 3.700 uIU/mL   FREE THYROXINE    Collection Time: 04/19/17 11:22 AM   Result Value Ref Range    Free T-4 1.24 0.53 - 1.43 ng/dL       (click the triangle to expand results)    Imaging/Procedures Review:    DX-CHEST-LIMITED (1 VIEW)   Final Result      No evidence of acute cardiopulmonary process.       ECHOCARDIOGRAM COMP W/O CONT    (Results Pending)   NM-CARDIAC STRESS TEST    (Results Pending)     EKG:   per my independant read:  QTc:wnl, HR: 75, Normal Sinus Rhythm, no ST/T changes    Assessment and Plan:      1. Chest pain  - Unclear etiology, need to rule out ACS  - Troponin ×2  - Order echocardiogram and stress test  - Start patient on aspirin, beta blocker, Lipitor   2. Blurred vision  Concern of TIA versus CVA  - MRI brain  - see above  - asa, bbl   3. Hypothyroidism  - cont home meds  - TSH and FT4 wnl    4. Prophylaxis: sc heparin  5. Code: Full code per patient  6. Dispo: she will be admitted to obs tele for management that is expected to take less than 2 midnights

## 2017-04-21 NOTE — DISCHARGE SUMMARY
FINAL DIAGNOSES:  1.  Chest pain, resolved.  2.  Negative myocardial perfusion study.  3.  Hypothyroidism.    HISTORY OF PRESENT ILLNESS:  The patient is a 67-year-old female who presents   with chest pain, 6/10, substernal, which did not radiate.  There were some   tingling sensations.    HOSPITAL COURSE:  Cardiac markers were negative.  She underwent myocardial   perfusion study.  There is no any evidence of ischemia.  Chest pain has not   recurred.  She is stable from a cardiopulmonary standpoint.  She will be   discharged to home to follow up with Ailyn Jefferson, nurse practitioner and primary   care provider, later this week.    DISCHARGE CONDITION:  Guarded, but stable with close followup.    DISCHARGE DIET:  Heart healthy.    DISCHARGE ACTIVITY:  As able.    DISCHARGE MEDICATIONS:  Synthroid 75 mcg daily per prior regimen.    LABORATORY DATA:  Cardiac markers are negative.  Electrolytes, blood counts   within normal limits with the exception of potassium low at 3.3.    STUDIES:  Chest x-ray is negative.  Myocardial perfusion study is negative.    CONSULTS:  None.    PROCEDURES:  None.    FOLLOWUP:  She will follow up with Ailyn Jefferson later this week.    Discharge time today 35 minutes.       ____________________________________     MD NAZIA SANCHEZ / MIGUEL    DD:  04/20/2017 12:13:30  DT:  04/20/2017 22:50:15    D#:  [id number]  Job#:  [id number]    cc: AILYN REESE

## 2017-05-04 ENCOUNTER — OFFICE VISIT (OUTPATIENT)
Dept: CARDIOLOGY | Facility: MEDICAL CENTER | Age: 67
End: 2017-05-04
Payer: MEDICARE

## 2017-05-04 VITALS
SYSTOLIC BLOOD PRESSURE: 110 MMHG | HEART RATE: 62 BPM | BODY MASS INDEX: 22.28 KG/M2 | HEIGHT: 68 IN | WEIGHT: 147 LBS | DIASTOLIC BLOOD PRESSURE: 80 MMHG | OXYGEN SATURATION: 96 %

## 2017-05-04 DIAGNOSIS — R06.02 SHORTNESS OF BREATH: ICD-10-CM

## 2017-05-04 DIAGNOSIS — R07.9 CHEST PAIN, UNSPECIFIED TYPE: ICD-10-CM

## 2017-05-04 LAB — EKG IMPRESSION: NORMAL

## 2017-05-04 PROCEDURE — 93000 ELECTROCARDIOGRAM COMPLETE: CPT | Performed by: INTERNAL MEDICINE

## 2017-05-04 PROCEDURE — 1101F PT FALLS ASSESS-DOCD LE1/YR: CPT | Mod: 8P | Performed by: INTERNAL MEDICINE

## 2017-05-04 PROCEDURE — G8420 CALC BMI NORM PARAMETERS: HCPCS | Performed by: INTERNAL MEDICINE

## 2017-05-04 PROCEDURE — 99203 OFFICE O/P NEW LOW 30 MIN: CPT | Performed by: INTERNAL MEDICINE

## 2017-05-04 ASSESSMENT — ENCOUNTER SYMPTOMS
CONSTITUTIONAL NEGATIVE: 1
SHORTNESS OF BREATH: 1
PALPITATIONS: 0
NAUSEA: 0
WEAKNESS: 0
VOMITING: 0

## 2017-05-04 NOTE — Clinical Note
[facility], formerly [facility]   [address] [place] [address]  [place] [address]  Phone: [phone]  Fax: [phone]              Sinai Hoover  1950    Encounter Date: 2017    Frank Harper M.D.          PROGRESS NOTE:  Subjective:   Sinai Hoover is a 67 y.o. female who seen in consultation today at the request of  for evaluation of chest pain. She was hospitalized on the morning of  with chest pain. That morning she awoke and associated bed has sharp rather localized midsternal pain that rated straight through to her back. The pain was not pleuritic but she is sick she was severely short of breath with this. She had no hemoptysis or wheezing and no sense of palpitations. The pain persisted and she was seen in the ER where her pain finally resolved. The patient was admitted overnight and myocardial perfusion scanning was normal. She had normal troponins. The patient MRI of her head but no CT of her chest.  She's had no recurrent chest pain but has noted that when she works out she feels mildly more short of breath than before. The patient is quite vigorously physically active and exercises on a very regular basis including aerobic exercise and weight lifting.  Risk factor profile negative for smoking, hypertension, hyperlipidemia, or diabetes. Family history is positive for premature coronary disease.  Surgeries: left hip replacement  Social history: , nonsmoker very physically active.  Family history: Father  of a heart attack at 57. Mother  of couple cases from alcoholism.    Past Medical History   Diagnosis Date   • Hypothyroidism      Past Surgical History   Procedure Laterality Date   • Colonoscopy  ,      recheck age 64y   • Meniscectomy     • Us-needle core bx-breast panel     ?      left breast, benign     Family History   Problem Relation Age of Onset   • Alcohol/Drug Mother    • Heart Disease Father    • Alcohol/Drug  "Brother      History   Smoking status   • Never Smoker    Smokeless tobacco   • Not on file     Allergies   Allergen Reactions   • Celebrex [Celecoxib] Nausea     vertigo     Outpatient Encounter Prescriptions as of 5/4/2017   Medication Sig Dispense Refill   • levothyroxine (SYNTHROID) 75 MCG TABS TAKE 1 TAB BY MOUTH EVERYDAY. 90 Tab 0     No facility-administered encounter medications on file as of 5/4/2017.     Review of Systems   Constitutional: Negative.  Negative for malaise/fatigue.   Respiratory: Positive for shortness of breath.    Cardiovascular: Negative for chest pain, palpitations and leg swelling.   Gastrointestinal: Negative for nausea and vomiting.   Neurological: Negative for weakness.        Objective:   /80 mmHg  Pulse 62  Ht 1.727 m (5' 7.99\")  Wt 66.679 kg (147 lb)  BMI 22.36 kg/m2  SpO2 96%    Physical Exam   Constitutional: She is oriented to person, place, and time. She appears well-developed and well-nourished. No distress.   HENT:   Head: Atraumatic.   Eyes: Conjunctivae and EOM are normal. Pupils are equal, round, and reactive to light.   Neck: Neck supple. No JVD present.   Cardiovascular: Normal rate and regular rhythm.    No murmur heard.  Pulmonary/Chest: Effort normal and breath sounds normal. No respiratory distress. She has no wheezes. She has no rales.   Abdominal: Soft. There is no tenderness.   Musculoskeletal: She exhibits no edema.   Neurological: She is alert and oriented to person, place, and time.   Skin: Skin is warm and dry. She is not diaphoretic.       Assessment:     1. Chest pain, unspecified type  EKG    CT-CTA CHEST PULMONARY ARTERY W/ RECONS   2. Shortness of breath  CT-CTA CHEST PULMONARY ARTERY W/ RECONS        EKG: Sinus rhythm at 61 bpm sinus arrhythmia. Normal EKG    Impression: Chest pain. The patient had the  sudden onset of sharp localized pain that radiated straight through to her back. This is associated with significant shortness of breath. The " chest x-ray from her hospital admission was personally reviewed and was unremarkable.    One consideration is that she may have had a pulmonary embolism. A CT scan of her chest was not done in the hospital. A CT for pulmonary was will be ordered. Other etiologies for chest pain include muscular skeletal pain and GE reflux.  She'll be notified of the CT scan results otherwise return as needed.      Dolores Ogden D.O.  [address]  [address]  [facility] [address]  VIA Facsimile: [phone]

## 2017-05-04 NOTE — MR AVS SNAPSHOT
"Sinai Hoover   2017 12:40 PM   Office Visit   MRN: [id number]    Department:  [facility]   Dept Phone:  [phone]    Description:  Female : 1950   Provider:  Frank Harper M.D.           Reason for Visit     Follow-Up           Allergies as of 2017     Allergen Noted Reactions    Celebrex [Celecoxib] 2017   Nausea    vertigo      You were diagnosed with     Chest pain, unspecified type   [3714202]       Shortness of breath   [786.05.ICD-9-CM]         Vital Signs     Blood Pressure Pulse Height Weight Body Mass Index Oxygen Saturation    110/80 mmHg 62 1.727 m (5' 7.99\") 66.679 kg (147 lb) 22.36 kg/m2 96%    Smoking Status                   Never Smoker            Basic Information     Date Of Birth Sex Race Ethnicity Preferred Language    1950 Female White Non- English      Your appointments     2017  1:00 PM   CT-CTA CHEST W/WO POST PROCESS with [facility] CT 1   [facility] [place]    [address]  [facility] [address]   [phone]              Problem List              ICD-10-CM Priority Class Noted - Resolved    Hypothyroidism E03.9   Unknown - Present    Chest pain R07.9   2017 - Present    Shortness of breath R06.02   2017 - Present      Health Maintenance        Date Due Completion Dates    IMM DTaP/Tdap/Td Vaccine (1 - Tdap) 1970 ---    IMM ZOSTER VACCINE 2011 ---    COLONOSCOPY 2014 (N/S)    Override on 2009: (N/S) (repeat in 5 yr, hx polyps)    MAMMOGRAM 2015, 10/29/2013, 2012, 2010, 2010, 2009, 2009, 2008, 2008, 3/21/2007, 3/21/2007    BONE DENSITY 2016    IMM PNEUMOCOCCAL 65+ (ADULT) LOW/MEDIUM RISK SERIES (1 of 2 - PCV13) 2016 ---    PAP SMEAR 2016            Results       Current Immunizations     No immunizations on file.      Below and/or attached are the medications your provider expects you " to take. Review all of your home medications and newly ordered medications with your provider and/or pharmacist. Follow medication instructions as directed by your provider and/or pharmacist. Please keep your medication list with you and share with your provider. Update the information when medications are discontinued, doses are changed, or new medications (including over-the-counter products) are added; and carry medication information at all times in the event of emergency situations     Allergies:  CELEBREX - Nausea               Medications  Valid as of: May 04, 2017 -  1:30 PM    Generic Name Brand Name Tablet Size Instructions for use    Levothyroxine Sodium (Tab) SYNTHROID 75 MCG TAKE 1 TAB BY MOUTH EVERYDAY.        .                 Medicines prescribed today were sent to:     [facility] [address] [place] [address] (), [place] [address] () [place] [address]    Phone: [phone] Fax: [phone]    Open 24 Hours?: No      Medication refill instructions:       If your prescription bottle indicates you have medication refills left, it is not necessary to call your provider’s office. Please contact your pharmacy and they will refill your medication.    If your prescription bottle indicates you do not have any refills left, you may request refills at any time through one of the following ways: The online [employer] system (except Urgent Care), by calling your provider’s office, or by asking your pharmacy to contact your provider’s office with a refill request. Medication refills are processed only during regular business hours and may not be available until the next business day. Your provider may request additional information or to have a follow-up visit with you prior to refilling your medication.   *Please Note: Medication refills are assigned a new Rx number when refilled electronically. Your pharmacy may indicate that no refills were authorized even though a new prescription for the  same medication is available at the pharmacy. Please request the medicine by name with the pharmacy before contacting your provider for a refill.        Your To Do List     Future Labs/Procedures Complete By Expires    CT-CTA CHEST PULMONARY ARTERY W/ RECONS  As directed 5/4/2018         MyChart Access Code: Activation code not generated  Current MyChart Status: Active

## 2017-05-04 NOTE — PROGRESS NOTES
Subjective:   Sinai Hoover is a 67 y.o. female who seen in consultation today at the request of  for evaluation of chest pain. She was hospitalized on the morning of  with chest pain. That morning she awoke and associated bed has sharp rather localized midsternal pain that rated straight through to her back. The pain was not pleuritic but she is sick she was severely short of breath with this. She had no hemoptysis or wheezing and no sense of palpitations. The pain persisted and she was seen in the ER where her pain finally resolved. The patient was admitted overnight and myocardial perfusion scanning was normal. She had normal troponins. The patient MRI of her head but no CT of her chest.  She's had no recurrent chest pain but has noted that when she works out she feels mildly more short of breath than before. The patient is quite vigorously physically active and exercises on a very regular basis including aerobic exercise and weight lifting.  Risk factor profile negative for smoking, hypertension, hyperlipidemia, or diabetes. Family history is positive for premature coronary disease.  Surgeries: left hip replacement  Social history: , nonsmoker very physically active.  Family history: Father  of a heart attack at 57. Mother  of couple cases from alcoholism.    Past Medical History   Diagnosis Date   • Hypothyroidism      Past Surgical History   Procedure Laterality Date   • Colonoscopy  ,      recheck age 64y   • Meniscectomy     • Us-needle core bx-breast panel     ?      left breast, benign     Family History   Problem Relation Age of Onset   • Alcohol/Drug Mother    • Heart Disease Father    • Alcohol/Drug Brother      History   Smoking status   • Never Smoker    Smokeless tobacco   • Not on file     Allergies   Allergen Reactions   • Celebrex [Celecoxib] Nausea     vertigo     Outpatient Encounter Prescriptions as of 2017   Medication Sig Dispense Refill   •  "levothyroxine (SYNTHROID) 75 MCG TABS TAKE 1 TAB BY MOUTH EVERYDAY. 90 Tab 0     No facility-administered encounter medications on file as of 5/4/2017.     Review of Systems   Constitutional: Negative.  Negative for malaise/fatigue.   Respiratory: Positive for shortness of breath.    Cardiovascular: Negative for chest pain, palpitations and leg swelling.   Gastrointestinal: Negative for nausea and vomiting.   Neurological: Negative for weakness.        Objective:   /80 mmHg  Pulse 62  Ht 1.727 m (5' 7.99\")  Wt 66.679 kg (147 lb)  BMI 22.36 kg/m2  SpO2 96%    Physical Exam   Constitutional: She is oriented to person, place, and time. She appears well-developed and well-nourished. No distress.   HENT:   Head: Atraumatic.   Eyes: Conjunctivae and EOM are normal. Pupils are equal, round, and reactive to light.   Neck: Neck supple. No JVD present.   Cardiovascular: Normal rate and regular rhythm.    No murmur heard.  Pulmonary/Chest: Effort normal and breath sounds normal. No respiratory distress. She has no wheezes. She has no rales.   Abdominal: Soft. There is no tenderness.   Musculoskeletal: She exhibits no edema.   Neurological: She is alert and oriented to person, place, and time.   Skin: Skin is warm and dry. She is not diaphoretic.       Assessment:     1. Chest pain, unspecified type  EKG    CT-CTA CHEST PULMONARY ARTERY W/ RECONS   2. Shortness of breath  CT-CTA CHEST PULMONARY ARTERY W/ RECONS        EKG: Sinus rhythm at 61 bpm sinus arrhythmia. Normal EKG    Impression: Chest pain. The patient had the  sudden onset of sharp localized pain that radiated straight through to her back. This is associated with significant shortness of breath. The chest x-ray from her hospital admission was personally reviewed and was unremarkable.    One consideration is that she may have had a pulmonary embolism. A CT scan of her chest was not done in the hospital. A CT for pulmonary was will be ordered. Other " etiologies for chest pain include muscular skeletal pain and GE reflux.  She'll be notified of the CT scan results otherwise return as needed.

## 2017-06-02 ENCOUNTER — HOSPITAL ENCOUNTER (OUTPATIENT)
Dept: RADIOLOGY | Facility: MEDICAL CENTER | Age: 67
End: 2017-06-02
Attending: INTERNAL MEDICINE
Payer: MEDICARE

## 2017-06-02 DIAGNOSIS — R07.9 CHEST PAIN, UNSPECIFIED TYPE: ICD-10-CM

## 2017-06-02 DIAGNOSIS — R06.02 SHORTNESS OF BREATH: ICD-10-CM

## 2017-06-02 PROCEDURE — 700117 HCHG RX CONTRAST REV CODE 255: Performed by: INTERNAL MEDICINE

## 2017-06-02 PROCEDURE — 71275 CT ANGIOGRAPHY CHEST: CPT

## 2017-06-02 RX ADMIN — IOHEXOL 100 ML: 350 INJECTION, SOLUTION INTRAVENOUS at 13:30

## 2017-06-05 ENCOUNTER — TELEPHONE (OUTPATIENT)
Dept: CARDIOLOGY | Facility: MEDICAL CENTER | Age: 67
End: 2017-06-05

## 2017-06-05 NOTE — TELEPHONE ENCOUNTER
Contacted pt. To give preliminary results. She is leaving for [place] tomorrow am.   Awaiting Dr. Harper's recommendations re: ronni PALACIO

## 2017-06-05 NOTE — TELEPHONE ENCOUNTER
----- Message from Marie Morales sent at 6/5/2017  1:29 PM PDT -----  Regarding: Patient calling for test results  [place]    Patient is calling to get the results of her CT-CTA chest that was done on Friday, 6/2 and can be reached at [phone].

## 2017-06-08 NOTE — TELEPHONE ENCOUNTER
Copy of report faxed to Dolores Ogden [phone]. Left message for pt. To advise that PCP will follow-up. ([place]. [place]              Message  Received: 2 days ago       ROCCO Amos L.PGURMEET       Caller: Unspecified (3 days ago, 2:03 PM)                     Follow up with primary care  Re; lucency findings.

## 2017-10-03 ENCOUNTER — HOSPITAL ENCOUNTER (OUTPATIENT)
Dept: RADIOLOGY | Facility: MEDICAL CENTER | Age: 67
End: 2017-10-03
Attending: FAMILY MEDICINE
Payer: MEDICARE

## 2017-10-03 DIAGNOSIS — R93.89 ABNORMAL CT OF THE CHEST: ICD-10-CM

## 2017-10-03 DIAGNOSIS — R93.7 ABNORMAL FINDINGS ON DIAGNOSTIC IMAGING OF PRT MS SYS: ICD-10-CM

## 2017-10-03 PROCEDURE — 72146 MRI CHEST SPINE W/O DYE: CPT

## 2017-10-03 PROCEDURE — 72148 MRI LUMBAR SPINE W/O DYE: CPT

## 2017-10-10 ENCOUNTER — APPOINTMENT (RX ONLY)
Dept: URBAN - METROPOLITAN AREA CLINIC 20 | Facility: CLINIC | Age: 67
Setting detail: DERMATOLOGY
End: 2017-10-10

## 2017-10-10 ENCOUNTER — RX ONLY (OUTPATIENT)
Age: 67
Setting detail: RX ONLY
End: 2017-10-10

## 2017-10-10 DIAGNOSIS — L82.1 OTHER SEBORRHEIC KERATOSIS: ICD-10-CM

## 2017-10-10 DIAGNOSIS — D22 MELANOCYTIC NEVI: ICD-10-CM

## 2017-10-10 DIAGNOSIS — D18.0 HEMANGIOMA: ICD-10-CM

## 2017-10-10 DIAGNOSIS — L57.8 OTHER SKIN CHANGES DUE TO CHRONIC EXPOSURE TO NONIONIZING RADIATION: ICD-10-CM

## 2017-10-10 DIAGNOSIS — L81.4 OTHER MELANIN HYPERPIGMENTATION: ICD-10-CM

## 2017-10-10 DIAGNOSIS — Z85.828 PERSONAL HISTORY OF OTHER MALIGNANT NEOPLASM OF SKIN: ICD-10-CM

## 2017-10-10 PROBLEM — D22.5 MELANOCYTIC NEVI OF TRUNK: Status: ACTIVE | Noted: 2017-10-10

## 2017-10-10 PROBLEM — D18.01 HEMANGIOMA OF SKIN AND SUBCUTANEOUS TISSUE: Status: ACTIVE | Noted: 2017-10-10

## 2017-10-10 PROBLEM — D48.5 NEOPLASM OF UNCERTAIN BEHAVIOR OF SKIN: Status: ACTIVE | Noted: 2017-10-10

## 2017-10-10 PROCEDURE — ? COUNSELING

## 2017-10-10 PROCEDURE — 11100: CPT

## 2017-10-10 PROCEDURE — ? BIOPSY BY SHAVE METHOD

## 2017-10-10 PROCEDURE — 99213 OFFICE O/P EST LOW 20 MIN: CPT | Mod: 25

## 2017-10-10 PROCEDURE — ? PRESCRIPTION MEDICATION MANAGEMENT

## 2017-10-10 RX ORDER — IMIQUIMOD 50 MG/G
CREAM TOPICAL
Qty: 1 | Refills: 0 | Status: ERX | COMMUNITY
Start: 2017-10-10

## 2017-10-10 ASSESSMENT — LOCATION DETAILED DESCRIPTION DERM
LOCATION DETAILED: LEFT SUPERIOR UPPER BACK
LOCATION DETAILED: LEFT RADIAL DORSAL HAND
LOCATION DETAILED: RIGHT INFERIOR CENTRAL MALAR CHEEK
LOCATION DETAILED: RIGHT INFERIOR ANTERIOR NECK
LOCATION DETAILED: RIGHT SUPERIOR UPPER BACK
LOCATION DETAILED: RIGHT INFERIOR MEDIAL UPPER BACK
LOCATION DETAILED: RIGHT RADIAL DORSAL HAND
LOCATION DETAILED: LEFT PROXIMAL DORSAL FOREARM

## 2017-10-10 ASSESSMENT — LOCATION SIMPLE DESCRIPTION DERM
LOCATION SIMPLE: LEFT FOREARM
LOCATION SIMPLE: LEFT UPPER BACK
LOCATION SIMPLE: LEFT HAND
LOCATION SIMPLE: RIGHT CHEEK
LOCATION SIMPLE: RIGHT HAND
LOCATION SIMPLE: RIGHT ANTERIOR NECK
LOCATION SIMPLE: RIGHT UPPER BACK

## 2017-10-10 ASSESSMENT — LOCATION ZONE DERM
LOCATION ZONE: FACE
LOCATION ZONE: ARM
LOCATION ZONE: HAND
LOCATION ZONE: TRUNK
LOCATION ZONE: NECK

## 2017-10-10 NOTE — PROCEDURE: BIOPSY BY SHAVE METHOD
Cryotherapy Text: The wound bed was treated with cryotherapy after the biopsy was performed.
Dressing: Band-Aid
Wound Care: Vaseline
Anesthesia Volume In Cc: 0.5
Biopsy Method: Personna blade
Lab: [id number]
Electrodesiccation And Curettage Text: The wound bed was treated with electrodesiccation and curettage after the biopsy was performed.
Size Of Lesion In Cm: 0
Biopsy Type: H and E
Detail Level: Detailed
Post-Care Instructions: I reviewed with the patient in detail post-care instructions. Patient is to keep the biopsy site dry overnight, and then apply bacitracin twice daily until healed. Patient may apply hydrogen peroxide soaks to remove any crusting.
Curettage Text: The wound bed was treated with curettage after the biopsy was performed.
Anesthesia Type: 1% lidocaine with 1:100,000 epinephrine and 408mcg clindamycin/ml and a 1:10 solution of 8.4% sodium bicarbonate
Consent: Written consent was obtained and risks were reviewed including but not limited to scarring, infection, bleeding, scabbing, incomplete removal, nerve damage and allergy to anesthesia.
Render Post-Care Instructions In Note?: no
Type Of Destruction Used: Curettage
Hemostasis: Drysol and Electrocautery
Electrodesiccation Text: The wound bed was treated with electrodesiccation after the biopsy was performed.
Billing Type: Third-Party Bill
Silver Nitrate Text: The wound bed was treated with silver nitrate after the biopsy was performed.
Lab Facility: 
Notification Instructions: Patient will be notified of biopsy results. However, patient instructed to call the office if not contacted within 2 weeks.

## 2017-10-26 ENCOUNTER — OFFICE VISIT (OUTPATIENT)
Dept: PULMONOLOGY | Facility: HOSPICE | Age: 67
End: 2017-10-26
Payer: MEDICARE

## 2017-10-26 VITALS
SYSTOLIC BLOOD PRESSURE: 110 MMHG | BODY MASS INDEX: 21.98 KG/M2 | HEIGHT: 68 IN | WEIGHT: 145 LBS | OXYGEN SATURATION: 97 % | HEART RATE: 64 BPM | DIASTOLIC BLOOD PRESSURE: 68 MMHG | RESPIRATION RATE: 16 BRPM

## 2017-10-26 DIAGNOSIS — J43.8 OTHER EMPHYSEMA (HCC): ICD-10-CM

## 2017-10-26 DIAGNOSIS — G47.33 OBSTRUCTIVE SLEEP APNEA: ICD-10-CM

## 2017-10-26 DIAGNOSIS — R91.8 MULTIPLE LUNG NODULES: ICD-10-CM

## 2017-10-26 PROCEDURE — 99204 OFFICE O/P NEW MOD 45 MIN: CPT | Performed by: INTERNAL MEDICINE

## 2017-10-26 RX ORDER — IMIQUIMOD 12.5 MG/.25G
CREAM TOPICAL
COMMUNITY
Start: 2017-10-10 | End: 2019-01-03

## 2017-10-26 RX ORDER — LEVOTHYROXINE SODIUM 88 UG/1
TABLET ORAL
COMMUNITY
Start: 2017-08-14 | End: 2019-05-16 | Stop reason: SDUPTHER

## 2017-10-26 RX ORDER — AMOXICILLIN 500 MG/1
CAPSULE ORAL
COMMUNITY
Start: 2017-08-08 | End: 2018-07-19

## 2017-10-26 RX ORDER — VALACYCLOVIR HYDROCHLORIDE 500 MG/1
TABLET, FILM COATED ORAL
COMMUNITY
Start: 2017-09-11 | End: 2019-11-20

## 2017-10-26 NOTE — PATIENT INSTRUCTIONS
1. We have scheduled a sleep study  2. We have scheduled pulmonary function testing  3. We have scheduled a CT scan of the chest to be done in June 2018

## 2017-10-26 NOTE — PROGRESS NOTES
Sinai Hoover is a 67 y.o. female here for abnormal CT scan of the chest.  Patient was referred by Dr. Reyna Ogden.    History of Present Illness:    The patient is a 67-year-old female comes in concerned about a recent CT scan of the chest. Apparently patient was having some disorientation and there was a concern about possible pulmonary emboli. She ended up having a CT scan of the chest PE protocol on June 2, 2017. This showed no evidence of pulmonary emboli but did show evidence of some hyperexpanded and emphysematous lungs and a 4.1 mm nodule in the right upper lobe and a 1.3 mm nodule in the right lower lobe. The patient is a prior smoker but she only smoked for about 6 months when she was in college. 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 She denies any shortness of breath on exertion. In fact she recently walked to the [facility] which is 18 miles without difficulty and this was an elevation of 8000 feet. Her oxygen saturations 97% today. She denies any chest tightness or wheezing or coughing or congestion. Her main complaint is his pain tired during the daytime. She does snore and she sometimes wakes up gasping and choking. She has trouble maintaining sleep. She sometimes will wake up with sweats and with some headaches. She has never been told that she stops breathing in her sleep. She did have an overnight oximetry which showed a low oxygen saturation of 78% from a baseline oxygen saturation of 95% and the oxygen desaturation index was 9.2 per the computer algorithm.    Constitutional:  Negative for fever, chills, sweats, and fatigue.  Eyes:  Negative for eye pain and visual changes.  HENT:  Negative for tinnitus and hoarse voice.  Cardiovascular:  Negative for chest pain, leg swelling, syncope and orthopnea.  Respiratory:  See HPI for pertinent negatives  Sleep: Positive for somnolence, loud snoring, sleep disturbance due to  "breathing, insomnia.  Gastrointestinal:  Negative for dysphagia, nausea and abdominal pain.  Heme/lymph:  Denies easy bruising, blood clots.  Musculoskeletal:  Negative for arthralgias, sore muscles and back pain.  Skin:  Negative for rash and color change.  Neurological:  Negative for headaches, lightheadedness and weakness.  Psychiatric:  Denies depression.    Current Outpatient Prescriptions   Medication Sig Dispense Refill   • levothyroxine (SYNTHROID) 75 MCG TABS TAKE 1 TAB BY MOUTH EVERYDAY. 90 Tab 0   • amoxicillin (AMOXIL) 500 MG Cap      • imiquimod (ALDARA) 5 % cream      • levothyroxine (SYNTHROID) 88 MCG Tab      • valacyclovir (VALTREX) 500 MG Tab        No current facility-administered medications for this visit.        Social History   Substance Use Topics   • Smoking status: Never Smoker   • Smokeless tobacco: Never Used   • Alcohol use Yes      Comment: rare       Past Medical History:   Diagnosis Date   • Hypothyroidism        Past Surgical History:   Procedure Laterality Date   • MENISCECTOMY  1994   • COLONOSCOPY  2001, 2009    recheck age 64y   • US-NEEDLE CORE BX-BREAST PANEL    2004 ?     left breast, benign       Allergies:  Celebrex [celecoxib]    Family History   Problem Relation Age of Onset   • Alcohol/Drug Mother    • Heart Disease Father    • Alcohol/Drug Brother        Physical Examination    Vitals:    10/26/17 1102   Height: 1.727 m (5' 7.99\")   Weight: 65.8 kg (145 lb)   Weight % change since last entry.: 0 %   BP: 110/68   Pulse: 64   BMI (Calculated): 22.05   Resp: 16   O2 sat % room air: 97 %       Physical Exam:  Constitutional:  Well developed and well nourished.  Head:  Normocephalic and atraumatic.  Nose:  Nose normal.  Mouth/Throat:  Oropharynx is clear and moist, no lesions.  Mallampati class III  Eyes:  Conjunctivae and EOM are normal.  Pupils are equal, round, and reactive to light.  Neck:  Normal range of motion.  Supple.  No JVD. No tracheal deviation.  No " thyromegally  Cardiovascular:  Normal rate, regular rhythm, normal heart sounds and intact distal pulses.  Pulmonary/Chest:  No accessory muscle use.  No wheezing, rales or rhonchi.  No dullness to percussion, tenderness or deformity.  Abdominal:  Soft.  No ascites.  No Hepatosplenomegally.  Non tender.  Musculoskeletal.  Normal range of motion.  No muscular atrophy.  Lymphadenopathy:  No cervical or supraclavicular adenopathy  Neurological:  Alert and oriented.  Cranial nerves intact.  No focal deficits  Skin:  No rashes or ulcers.  Psyciatric:  Normal mood and affect.      Assessment and Plan:  1. Obstructive sleep apnea  The patient has snoring and excessive daytime somnolence associated with a crowded oropharynx and overnight oximetry showing significant oxygen desaturations. I have recommended a sleep study and we'll see her back when this is been completed.  - POLYSOMNOGRAPHY, 4 OR MORE; Future    2. Multiple lung nodules  The patient is a 4.1 mm nodule and a 1.3 mm nodule. Per the Fleischer Society recommendations a repeat CT scan of the chest is recommended in 12 months.  - CT-CHEST (THORAX) W/O; Future    3. Other emphysema (CMS-HCC)  The CT scan report also indicated emphysematous changes but per my review of the films I do not appreciate emphysematous changes. I doubt that she has COPD and she has excellent functional status. I ordered pulmonary function tests for further evaluation.  - AMB PULMONARY FUNCTION TEST/LAB; Future          Followup Return in about 6 weeks (around 12/7/2017) for follow up with the sleep physician, follow up visit with LIZBETH.

## 2017-10-30 ENCOUNTER — APPOINTMENT (OUTPATIENT)
Dept: RADIOLOGY | Facility: MEDICAL CENTER | Age: 67
End: 2017-10-30
Attending: FAMILY MEDICINE
Payer: MEDICARE

## 2017-10-30 DIAGNOSIS — R93.7 ABNORMAL FINDINGS ON DIAGNOSTIC IMAGING OF MUSCULOSKELETAL SYSTEM: ICD-10-CM

## 2017-10-30 PROCEDURE — 700117 HCHG RX CONTRAST REV CODE 255: Performed by: FAMILY MEDICINE

## 2017-10-30 PROCEDURE — 72157 MRI CHEST SPINE W/O & W/DYE: CPT

## 2017-10-30 PROCEDURE — A9579 GAD-BASE MR CONTRAST NOS,1ML: HCPCS | Performed by: FAMILY MEDICINE

## 2017-10-30 RX ADMIN — GADODIAMIDE 15 ML: 287 INJECTION INTRAVENOUS at 14:35

## 2017-11-02 ENCOUNTER — NON-PROVIDER VISIT (OUTPATIENT)
Dept: PULMONOLOGY | Facility: HOSPICE | Age: 67
End: 2017-11-02
Payer: MEDICARE

## 2017-11-02 VITALS — WEIGHT: 148 LBS | BODY MASS INDEX: 23.23 KG/M2 | HEIGHT: 67 IN

## 2017-11-02 DIAGNOSIS — J43.8 OTHER EMPHYSEMA (HCC): ICD-10-CM

## 2017-11-02 PROCEDURE — 94726 PLETHYSMOGRAPHY LUNG VOLUMES: CPT | Performed by: INTERNAL MEDICINE

## 2017-11-02 PROCEDURE — 94729 DIFFUSING CAPACITY: CPT | Performed by: INTERNAL MEDICINE

## 2017-11-02 PROCEDURE — 94060 EVALUATION OF WHEEZING: CPT | Performed by: INTERNAL MEDICINE

## 2017-11-02 ASSESSMENT — PULMONARY FUNCTION TESTS
FEV1/FVC: 82.5
FEV1_PERCENT_CHANGE: -4
FEV1/FVC_PERCENT_PREDICTED: 76
FVC: 3.35
FVC_PERCENT_PREDICTED: 91
FVC_PREDICTED: 3.48
FEV1_PERCENT_CHANGE: 1
FEV1: 2.64
FEV1_PREDICTED: 2.66
FEV1_PERCENT_PREDICTED: 99
FEV1/FVC: 78
FEV1_PERCENT_PREDICTED: 97
FEV1/FVC_PERCENT_PREDICTED: 109
FVC: 3.2
FEV1: 2.6
FEV1/FVC_PERCENT_PREDICTED: 101
FVC_PERCENT_PREDICTED: 96
FEV1/FVC_PERCENT_CHANGE: -25

## 2017-11-02 NOTE — PROCEDURES
Technician: JARED Summers    Technician Comment:  Good patient effort & cooperation.  The results of this test meet the ATS/ERS standards for acceptability & reproducibility.  Test was performed on the [employer] Body Plethysmograph-Elite DX system.  Predicted values were NHanes-3 for spirometry, [facility] for DLCO, ITS for Lung Volumes.  The DLCO was uncorrected for Hgb.  A bronchodilator of Ventolin HFA -2puffs via spacer administered.    Interpretation:    Lung function testing completed November 2, 2017 showed normal spirometry. No bronchodilator response. Lung volumes were entirely normal. Oxygen transfer was normal as well. Flow volume loop is normal and the patient effort was noted.    Impression is completely normal lung function with regard to spirometry, volumes and oxygen transfer.

## 2017-12-08 ENCOUNTER — SLEEP STUDY (OUTPATIENT)
Dept: SLEEP MEDICINE | Facility: MEDICAL CENTER | Age: 67
End: 2017-12-08
Attending: INTERNAL MEDICINE
Payer: MEDICARE

## 2017-12-08 DIAGNOSIS — G47.33 OBSTRUCTIVE SLEEP APNEA: ICD-10-CM

## 2017-12-08 PROCEDURE — 95810 POLYSOM 6/> YRS 4/> PARAM: CPT | Performed by: INTERNAL MEDICINE

## 2017-12-11 NOTE — PROCEDURES
Clinical Comments:  The patient underwent a comprehensive polysomnogram using the standard montage for measurement of parameters of sleep, respiratory events, movement abnormalities, heart rate and rhythm. A microphone was used to monitor snoring.      ANALYSIS:  The total recording time was 539.9 minutes with a sleep period of 452.2 minutes and the total sleep time was 379.2 minutes with a sleep efficiency of 70.2%.  The sleep latency was 87.7 minutes, and REM latency was 70.5 minutes.  The patient experienced 227 arousals in total, for an arousal index of 35.9    RESPIRATORY: The patient had 16 apneas in total.  Of these, 14 were obstructive apneas, and 2 were central apneas.  This resulted in an apnea index (AI) of 2.5.  The patient had 84 hypopneas, for a hypopnea index of 13.3.  The overall AHI was 15.8, while the AHI during Stage R sleep was 19.5.  AHI while supine was 62.0.    OXIMETRY: Oxygen saturation monitoring showed a mean SpO2 of 92.4%, with a minimum oxygen saturation of 82.0%.  Oxygen saturations were less than or = 89% for 10.3 minutes of sleep time.    CARDIAC: The highest heart rate during the recording was 77.0 beats per minute.  The average heart rate during sleep was 58.0 bpm.    LIMB MOVEMENTS: There were a total of 75 PLMs during sleep, of which 5 were PLMs arousals.  This resulted in a PLMS index of 11.9.      Interpretation:    Ms. Hoover presented with snoring and documented nocturnal hypoxemia. This is a diagnostic study.    There is fragmentation of sleep with very prolonged sleep onset latency, increased wake after sleep onset time, and a marked increase in the arousal and awakening index. Nonetheless significant slow-wave sleep and REM sleep time was recorded. There are some periodic limb movements but the periodic limb movement with arousal index is only 0.8 events per hour.    The apnea hypopnea index is 15.8 events per hour, consisting of hypopnea and occasional obstructive apneas  episodes that occurred primarily in the second half of the night but did not seem to be related to body position. The lowest arterial oxygen saturation was 82% on room air and she spent about 11% of the study time with a saturation below 90%. The heart rate varied from 46-77 bpm. Some choking respirations were noted in the supine body position.    Assessment:  Mild-to-moderate obstructive sleep apnea hypopnea with an apnea hypopnea index of 15.8 events per hour and a lowest arterial oxygen saturation of 82% on room air. Fragmentation of sleep related to the breathing events and perhaps to laboratory effect as well. Mild bradycardia is noted.    Recommendations:  Airway pressurization, either guided by a titration polysomnogram or initiated through the use of auto titrating CPAP. Alternative treatments for sleep-disordered breathing include reconstructive otolaryngologic surgery, dental appliances and weight loss. If any these latter treatment options are selected, a follow-up polysomnogram is suggested to assess the efficacy of therapy. Behavioral measures including avoidance of sedatives, alcohol and supine body position may be of additional benefit.

## 2017-12-14 ENCOUNTER — OFFICE VISIT (OUTPATIENT)
Dept: PULMONOLOGY | Facility: HOSPICE | Age: 67
End: 2017-12-14
Payer: MEDICARE

## 2017-12-14 VITALS
RESPIRATION RATE: 16 BRPM | DIASTOLIC BLOOD PRESSURE: 74 MMHG | HEIGHT: 67 IN | BODY MASS INDEX: 23.23 KG/M2 | OXYGEN SATURATION: 97 % | SYSTOLIC BLOOD PRESSURE: 126 MMHG | HEART RATE: 66 BPM | WEIGHT: 148 LBS

## 2017-12-14 DIAGNOSIS — R93.89 ABNORMAL CT OF THE CHEST: ICD-10-CM

## 2017-12-14 DIAGNOSIS — G47.33 OSA (OBSTRUCTIVE SLEEP APNEA): ICD-10-CM

## 2017-12-14 PROCEDURE — 99213 OFFICE O/P EST LOW 20 MIN: CPT | Performed by: NURSE PRACTITIONER

## 2017-12-14 ASSESSMENT — PATIENT HEALTH QUESTIONNAIRE - PHQ9: CLINICAL INTERPRETATION OF PHQ2 SCORE: 0

## 2017-12-14 NOTE — PATIENT INSTRUCTIONS
Sleep Apnea   Sleep apnea is a sleep disorder characterized by abnormal pauses in breathing while you sleep. When your breathing pauses, the level of oxygen in your blood decreases. This causes you to move out of deep sleep and into light sleep. As a result, your quality of sleep is poor, and the system that carries your blood throughout your body (cardiovascular system) experiences stress. If sleep apnea remains untreated, the following conditions can develop:  · High blood pressure (hypertension).  · Coronary artery disease.  · Inability to achieve or maintain an erection (impotence).  · Impairment of your thought process (cognitive dysfunction).  There are three types of sleep apnea:  1. Obstructive sleep apnea--Pauses in breathing during sleep because of a blocked airway.  2. Central sleep apnea--Pauses in breathing during sleep because the area of the brain that controls your breathing does not send the correct signals to the muscles that control breathing.  3. Mixed sleep apnea--A combination of both obstructive and central sleep apnea.  RISK FACTORS  The following risk factors can increase your risk of developing sleep apnea:  · Being overweight.  · Smoking.  · Having narrow passages in your nose and throat.  · Being of older age.  · Being male.  · Alcohol use.  · Sedative and tranquilizer use.  · Ethnicity. Among individuals younger than 35 years,  Americans are at increased risk of sleep apnea.  SYMPTOMS   · Difficulty staying asleep.  · Daytime sleepiness and fatigue.  · Loss of energy.  · Irritability.  · Loud, heavy snoring.  · Morning headaches.  · Trouble concentrating.  · Forgetfulness.  · Decreased interest in sex.  · Unexplained sleepiness.  DIAGNOSIS   In order to diagnose sleep apnea, your caregiver will perform a physical examination. A sleep study done in the comfort of your own home may be appropriate if you are otherwise healthy. Your caregiver may also recommend that you spend the  "night in a sleep lab. In the sleep lab, several monitors record information about your heart, lungs, and brain while you sleep. Your leg and arm movements and blood oxygen level are also recorded.  TREATMENT  The following actions may help to resolve mild sleep apnea:  · Sleeping on your side.    · Using a decongestant if you have nasal congestion.    · Avoiding the use of depressants, including alcohol, sedatives, and narcotics.    · Losing weight and modifying your diet if you are overweight.  There also are devices and treatments to help open your airway:  · Oral appliances. These are custom-made mouthpieces that shift your lower jaw forward and slightly open your bite. This opens your airway.  · Devices that create positive airway pressure. This positive pressure \"splints\" your airway open to help you breathe better during sleep. The following devices create positive airway pressure:  ¨ Continuous positive airway pressure (CPAP) device. The CPAP device creates a continuous level of air pressure with an air pump. The air is delivered to your airway through a mask while you sleep. This continuous pressure keeps your airway open.  ¨ Nasal expiratory positive airway pressure (EPAP) device. The EPAP device creates positive air pressure as you exhale. The device consists of single-use valves, which are inserted into each nostril and held in place by adhesive. The valves create very little resistance when you inhale but create much more resistance when you exhale. That increased resistance creates the positive airway pressure. This positive pressure while you exhale keeps your airway open, making it easier to breath when you inhale again.  ¨ Bilevel positive airway pressure (BPAP) device. The BPAP device is used mainly in patients with central sleep apnea. This device is similar to the CPAP device because it also uses an air pump to deliver continuous air pressure through a mask. However, with the BPAP machine, the " pressure is set at two different levels. The pressure when you exhale is lower than the pressure when you inhale.  · Surgery. Typically, surgery is only done if you cannot comply with less invasive treatments or if the less invasive treatments do not improve your condition. Surgery involves removing excess tissue in your airway to create a wider passage way.     This information is not intended to replace advice given to you by your health care provider. Make sure you discuss any questions you have with your health care provider.     Document Released: 12/08/2003 Document Revised: 01/08/2016 Document Reviewed: 04/25/2013  [employer] Interactive Patient Education ©2016 [employer]

## 2017-12-14 NOTE — PROGRESS NOTES
Chief Complaint   Patient presents with   • Apnea   • Results     PFT, Sleep Study, CT not due till 6/2018       HPI:  Sinai Hoover is a 67 y.o. year old female here today for follow-up on her PFT's and Polysomnogram. She was seen as a new patient to our office 10/26/2017 with Dr. Luis Queen. Referred for further work up on an abnormal CT scan of the chest. Apparently patient was having some disorientation and there was a concern about possible pulmonary emboli. She ended up having a CT scan of the chest PE protocol on June 2, 2017. This showed no evidence of pulmonary emboli but did show evidence of some hyperexpanded and emphysematous lungs and a 4.1 mm nodule in the right upper lobe and a 1.3 mm nodule in the right lower lobe. The patient is a prior smoker but she only smoked for about 6 months when she was in college. 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 She denies any shortness of breath on exertion. In fact she recently walked to the [facility] which is 18 miles without difficulty and this was an elevation of 8000 feet. Her oxygen saturations 97% today. She denies any chest tightness or wheezing or coughing or congestion. Her main complaint is his pain tired during the daytime. She does snore and she sometimes wakes up gasping and choking. She has trouble maintaining sleep. She sometimes will wake up with sweats and with some headaches. She has never been told that she stops breathing in her sleep. She did have an overnight oximetry which showed a low oxygen saturation of 78% from a baseline oxygen saturation of 95% and the oxygen desaturation index was 9.2 per the computer algorithm.    PFT's were completed 11/2/2017 and indicates normal spirometry with an FEV1 of 2.60 L, 97% predicted with an FEV1/FVC ratio of 78 and a DLCO of 134% predicted.     Polysomnogram 12/8/2017 indicates evidence of moderate sleep apnea with an AHI of  15.8, REM index of 195 with a low 02 saturation of 82%. She had fragmentation of sleep with very prolonged sleep onset latency, increased wake after sleep onset time, and a marked increase in the arousal and awakening index      Past Medical History:   Diagnosis Date   • Hypothyroidism        Past Surgical History:   Procedure Laterality Date   • MENISCECTOMY  1994   • COLONOSCOPY  2001, 2009    recheck age 64y   • US-NEEDLE CORE BX-BREAST PANEL    2004 ?     left breast, benign       Family History   Problem Relation Age of Onset   • Alcohol/Drug Mother    • Heart Disease Father    • Alcohol/Drug Brother        Social History     Social History   • Marital status:      Spouse name: N/A   • Number of children: N/A   • Years of education: N/A     Occupational History   • Not on file.     Social History Main Topics   • Smoking status: Never Smoker   • Smokeless tobacco: Never Used   • Alcohol use Yes      Comment: rare   • Drug use: No   • Sexual activity: Not on file      Comment: , interior design, 3 daughters     Other Topics Concern   • Not on file     Social History Narrative   • No narrative on file         ROS:  Constitutional: Denies fevers, chills, sweats, weight loss  Eyes: Denies glasses, vision loss, pain, drainage, double vision  Ears/Nose/Mouth/Throat: Denies rhinitis, nasal congestion, ear ache, difficulty hearing, sore throat, persistent hoarseness, decayed teeth/toothache  Cardiovascular: Denies chest pain, tightness, palpitations, swelling in feet/legs, fainting, difficulty breathing when laying down  Respiratory: See HPI   GI: Denies heartburn, difficulty swallowing, nausea, vomiting, abdominal pain, diarrhea, constipation  : Denies frequent urination, painful urination  Integumentary: Denies rashes, lumps or color changes  MSK: Denies painful joints, sore muscles, and back pain.   Neurological: Denies frequent headaches, dizziness, weakness  Sleep: See HPI       Current Outpatient  "Prescriptions   Medication Sig Dispense Refill   • amoxicillin (AMOXIL) 500 MG Cap      • imiquimod (ALDARA) 5 % cream      • levothyroxine (SYNTHROID) 88 MCG Tab      • valacyclovir (VALTREX) 500 MG Tab      • levothyroxine (SYNTHROID) 75 MCG TABS TAKE 1 TAB BY MOUTH EVERYDAY. 90 Tab 0     No current facility-administered medications for this visit.        Allergies   Allergen Reactions   • Celebrex [Celecoxib] Nausea     vertigo       Blood pressure 126/74, pulse 66, resp. rate 16, height 1.702 m (5' 7\"), weight 67.1 kg (148 lb), SpO2 97 %.    PE:   Appearance: Well developed, well nourished, no acute distress  Eyes: PERRL, EOM intact, sclera white, conjunctiva moist  Ears: no lesions or deformities  Hearing: grossly intact  Nose: no lesions or deformities  Oropharynx: tongue normal, posterior pharynx without erythema or exudate  Mallampati Classification: Class 4   Neck: supple, trachea midline, no masses   Respiratory effort: no intercostal retractions or use of accessory muscles  Lung auscultation: no rales, rhonchi or wheezes  Heart auscultation: no murmur rub or gallop  Extremities: no cyanosis or edema  Abdomen: soft ,non tender, no masses  Gait and Station: normal  Digits and nails: no clubbing, cyanosis, petechiae or nodes.  Cranial nerves: grossly intact  Skin: no rashes, lesions or ulcers noted  Orientation: Oriented to time, person and place  Mood and affect: mood and affect appropriate, normal interaction with examiner  Judgement: Intact          Assessment:  1. JENNIFER (obstructive sleep apnea)  DME CPAP   2. Abnormal CT of the chest           Plan:      1) Reviewed sleep study in detail. Discussed pathophysiology of sleep apnea and various treatment options in detail. She is amendable to a trial of airway pressurization. Order for Auto CPAP at 5-15 CM H20. Handout provided on sleep apnea  2) Sleep hygiene discussed.   3) Normal PFT's.  4) Pending repeat CT chest June 2018.   5) She states she will get " her Influenza vaccine with her PCP.   6) Follow up in 6 weeks with compliance card download, sooner if needed.

## 2017-12-28 ENCOUNTER — TELEPHONE (OUTPATIENT)
Dept: PULMONOLOGY | Facility: HOSPICE | Age: 67
End: 2017-12-28

## 2017-12-28 NOTE — TELEPHONE ENCOUNTER
Sinai called our office today to let us know that she has not yet received a CPAP machine from [facility] and would like to try to get one from [facility]. She is requesting that we send all orders over to them with documentation.   I spoke Sridevi at [facility] who said that they are waiting to hear back from Insurance.     I consulted with our DME Coordinator Taniya who said that she would get the orders canceled with [employer] and gave the OK to fax information over to [facility].   I faxed orders, demographics, copy of insurance and ID, last office note, and testing to [facility].     RECEIVED CONFIRMATION.

## 2018-01-30 ENCOUNTER — APPOINTMENT (RX ONLY)
Dept: URBAN - METROPOLITAN AREA CLINIC 4 | Facility: CLINIC | Age: 68
Setting detail: DERMATOLOGY
End: 2018-01-30

## 2018-01-30 DIAGNOSIS — L82.0 INFLAMED SEBORRHEIC KERATOSIS: ICD-10-CM

## 2018-01-30 DIAGNOSIS — L57.8 OTHER SKIN CHANGES DUE TO CHRONIC EXPOSURE TO NONIONIZING RADIATION: ICD-10-CM

## 2018-01-30 PROBLEM — C44.319 BASAL CELL CARCINOMA OF SKIN OF OTHER PARTS OF FACE: Status: ACTIVE | Noted: 2018-01-30

## 2018-01-30 PROBLEM — C44.311 BASAL CELL CARCINOMA OF SKIN OF NOSE: Status: ACTIVE | Noted: 2018-01-30

## 2018-01-30 PROCEDURE — 99213 OFFICE O/P EST LOW 20 MIN: CPT

## 2018-01-30 PROCEDURE — ? ADDITIONAL NOTES

## 2018-01-30 NOTE — PROCEDURE: ADDITIONAL NOTES
Additional Notes: S/p Imiquimod.  Appears clear today!
Additional Notes: Clear!
Additional Notes: Refer for Remington and Paulie.

## 2018-02-14 ENCOUNTER — OFFICE VISIT (OUTPATIENT)
Dept: PULMONOLOGY | Facility: HOSPICE | Age: 68
End: 2018-02-14
Payer: MEDICARE

## 2018-02-14 VITALS
TEMPERATURE: 97.9 F | BODY MASS INDEX: 23.54 KG/M2 | WEIGHT: 150 LBS | OXYGEN SATURATION: 95 % | DIASTOLIC BLOOD PRESSURE: 88 MMHG | HEART RATE: 75 BPM | RESPIRATION RATE: 16 BRPM | SYSTOLIC BLOOD PRESSURE: 126 MMHG | HEIGHT: 67 IN

## 2018-02-14 DIAGNOSIS — G47.33 OSA (OBSTRUCTIVE SLEEP APNEA): ICD-10-CM

## 2018-02-14 DIAGNOSIS — R93.89 ABNORMAL CT OF THE CHEST: ICD-10-CM

## 2018-02-14 PROCEDURE — 99214 OFFICE O/P EST MOD 30 MIN: CPT | Performed by: NURSE PRACTITIONER

## 2018-02-14 NOTE — PATIENT INSTRUCTIONS
Plan:    1) Continue Auto CPAP @ 5-15 CM H20.   2) Sleep hygiene discussed. Recommend keeping a set sleep/wake schedule. Logging enough hours of sleep. Limiting/Avoiding naps. No caffeine after noon and no heavy meals in the evening. Recommend daily exercise. She is quite active and hikes routinely.   3) Normal PFT's  4) Pending repeat CT chest in June 2018.   5) Pt has not had her Influenza vaccine, but will get a Pneumonia and Influenza vaccine through her PCP.   6) Follow up in July after CT chest with repeat compliance card download, sooner if needed.

## 2018-02-14 NOTE — PROGRESS NOTES
Chief Complaint   Patient presents with   • Apnea       HPI:  Sinai Hoover is a 68 y.o. year old female here today for follow-up on her obstructive sleep apnea. She was seen as a new patient to our office 10/26/2017 with Dr. Luis Queen. Referred for further work up on an abnormal CT scan of the chest. Apparently patient was having some disorientation and there was a concern about possible pulmonary emboli. She ended up having a CT scan of the chest PE protocol on June 2, 2017. This showed no evidence of pulmonary emboli but did show evidence of some hyperexpanded and emphysematous lungs and a 4.1 mm nodule in the right upper lobe and a 1.3 mm nodule in the right lower lobe. The patient is a prior smoker but she only smoked for about 6 months when she was in college. 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 Dr. Queen recommended a repeat CT chest in June 2018 which is ordered.   She denied any shortness of breath on exertion. She informed Dr. Queen that prior to her office visit walked to the [facility] which is 18 miles without difficulty and this was an elevation of 8000 feet. Her oxygen saturations 95% today. She denies any chest tightness or wheezing or coughing or congestion. She did have complaints of daytime fatigue. She noted a history of snoring and waking gasping for air at night. She did have an overnight oximetry which showed a low oxygen saturation of 78% from a baseline oxygen saturation of 95% and the oxygen desaturation index was 9.2 per the computer algorithm.     PFT's were completed 11/2/2017 and indicates normal spirometry with an FEV1 of 2.60 L, 97% predicted with an FEV1/FVC ratio of 78 and a DLCO of 134% predicted.      Polysomnogram 12/8/2017 indicates evidence of moderate sleep apnea with an AHI of 15.8, REM index of 195 with a low 02 saturation of 82%. She had fragmentation of sleep with very prolonged sleep onset  latency, increased wake after sleep onset time, and a marked increase in the arousal and awakening index.   She was started on Auto CPAP 5-15 CM H20 at her last office visit. Compliance card download today in the office indicates an AHI of 0.8 with an average use of 7-8 hours at night. Her peak average pressure is 6.7. She has tried multiple masks. She is currently using a cloth nasal mask which she feels is a good fit. She feels she is requiring less sleep. She does feel her energy levels have improved. She denies any morning headaches.       Past Medical History:   Diagnosis Date   • Hypothyroidism        Past Surgical History:   Procedure Laterality Date   • MENISCECTOMY  1994   • COLONOSCOPY  2001, 2009    recheck age 64y   • US-NEEDLE CORE BX-BREAST PANEL    2004 ?     left breast, benign       Family History   Problem Relation Age of Onset   • Alcohol/Drug Mother    • Heart Disease Father    • Alcohol/Drug Brother        Social History     Social History   • Marital status:      Spouse name: N/A   • Number of children: N/A   • Years of education: N/A     Occupational History   • Not on file.     Social History Main Topics   • Smoking status: Never Smoker   • Smokeless tobacco: Never Used   • Alcohol use Yes      Comment: rare   • Drug use: No   • Sexual activity: Not on file      Comment: , interior design, 3 daughters     Other Topics Concern   • Not on file     Social History Narrative   • No narrative on file         ROS:  Constitutional: Denies fevers, chills, sweats, weight loss  Eyes: Denies glasses, vision loss, pain, drainage, double vision  Ears/Nose/Mouth/Throat: Denies rhinitis, nasal congestion, ear ache, difficulty hearing, sore throat, persistent hoarseness, decayed teeth/toothache  Cardiovascular: Denies chest pain, tightness, palpitations, swelling in feet/legs, fainting, difficulty breathing when laying down  Respiratory: See HPI   GI: Denies heartburn, difficulty swallowing,  "nausea, vomiting, abdominal pain, diarrhea, constipation  : Denies frequent urination, painful urination  Integumentary: Denies rashes, lumps or color changes  MSK: Denies painful joints, sore muscles, and back pain.   Neurological: Denies frequent headaches, dizziness, weakness  Sleep: See HPI       Current Outpatient Prescriptions   Medication Sig Dispense Refill   • levothyroxine (SYNTHROID) 75 MCG TABS TAKE 1 TAB BY MOUTH EVERYDAY. 90 Tab 0   • amoxicillin (AMOXIL) 500 MG Cap      • imiquimod (ALDARA) 5 % cream      • levothyroxine (SYNTHROID) 88 MCG Tab      • valacyclovir (VALTREX) 500 MG Tab        No current facility-administered medications for this visit.        Allergies   Allergen Reactions   • Celebrex [Celecoxib] Nausea     vertigo       Blood pressure 126/88, pulse 75, temperature 36.6 °C (97.9 °F), resp. rate 16, height 1.702 m (5' 7\"), weight 68 kg (150 lb), SpO2 95 %.    PE:   Appearance: Well developed, well nourished, no acute distress  Eyes: PERRL, EOM intact, sclera white, conjunctiva moist  Ears: no lesions or deformities  Hearing: grossly intact  Nose: no lesions or deformities  Oropharynx: tongue normal, posterior pharynx without erythema or exudate  Mallampati Classification: Class 4   Neck: supple, trachea midline, no masses   Respiratory effort: no intercostal retractions or use of accessory muscles  Lung auscultation: no rales, rhonchi or wheezes  Heart auscultation: no murmur rub or gallop  Extremities: no cyanosis or edema  Abdomen: soft ,non tender, no masses  Gait and Station: normal  Digits and nails: no clubbing, cyanosis, petechiae or nodes.  Cranial nerves: grossly intact  Skin: no rashes, lesions or ulcers noted  Orientation: Oriented to time, person and place  Mood and affect: mood and affect appropriate, normal interaction with examiner  Judgement: Intact          Assessment:  1. JENNIFER (obstructive sleep apnea)  DME OTHER   2. Abnormal CT of the chest           Plan:    1) " Continue Auto CPAP @ 5-15 CM H20.   2) Sleep hygiene discussed. Recommend keeping a set sleep/wake schedule. Logging enough hours of sleep. Limiting/Avoiding naps. No caffeine after noon and no heavy meals in the evening. Recommend daily exercise. She is quite active and hikes routinely.   3) Normal PFT's  4) Pending repeat CT chest in June 2018.   5) Pt has not had her Influenza vaccine, but will get a Pneumonia and Influenza vaccine through her PCP.   6) Follow up in July after CT chest with repeat compliance card download, sooner if needed.

## 2018-04-30 ENCOUNTER — TELEPHONE (OUTPATIENT)
Dept: PULMONOLOGY | Facility: HOSPICE | Age: 68
End: 2018-04-30

## 2018-04-30 DIAGNOSIS — G47.33 OBSTRUCTIVE SLEEP APNEA: ICD-10-CM

## 2018-04-30 NOTE — TELEPHONE ENCOUNTER
Patient wants to try the Provent sleep apnea treatment and is requesting an order.  She states she wants to use this when camping or when there is no power source available.    Please sign order and pt will pick to submit.  She is paying out of pocket for this but they still need an rx

## 2018-05-01 NOTE — TELEPHONE ENCOUNTER
We do not recommend Provent for the treatment of sleep apnea. If she wants to try for short term use such as camping this should be fine. RX signed

## 2018-05-08 ENCOUNTER — APPOINTMENT (RX ONLY)
Dept: URBAN - METROPOLITAN AREA CLINIC 20 | Facility: CLINIC | Age: 68
Setting detail: DERMATOLOGY
End: 2018-05-08

## 2018-05-08 DIAGNOSIS — D18.0 HEMANGIOMA: ICD-10-CM

## 2018-05-08 DIAGNOSIS — L81.4 OTHER MELANIN HYPERPIGMENTATION: ICD-10-CM

## 2018-05-08 DIAGNOSIS — L82.1 OTHER SEBORRHEIC KERATOSIS: ICD-10-CM

## 2018-05-08 DIAGNOSIS — Z85.828 PERSONAL HISTORY OF OTHER MALIGNANT NEOPLASM OF SKIN: ICD-10-CM

## 2018-05-08 DIAGNOSIS — L57.0 ACTINIC KERATOSIS: ICD-10-CM

## 2018-05-08 DIAGNOSIS — L57.8 OTHER SKIN CHANGES DUE TO CHRONIC EXPOSURE TO NONIONIZING RADIATION: ICD-10-CM

## 2018-05-08 DIAGNOSIS — L82.0 INFLAMED SEBORRHEIC KERATOSIS: ICD-10-CM

## 2018-05-08 DIAGNOSIS — D22 MELANOCYTIC NEVI: ICD-10-CM

## 2018-05-08 PROBLEM — D22.5 MELANOCYTIC NEVI OF TRUNK: Status: ACTIVE | Noted: 2018-05-08

## 2018-05-08 PROBLEM — D18.01 HEMANGIOMA OF SKIN AND SUBCUTANEOUS TISSUE: Status: ACTIVE | Noted: 2018-05-08

## 2018-05-08 PROBLEM — D48.5 NEOPLASM OF UNCERTAIN BEHAVIOR OF SKIN: Status: ACTIVE | Noted: 2018-05-08

## 2018-05-08 PROCEDURE — 17003 DESTRUCT PREMALG LES 2-14: CPT

## 2018-05-08 PROCEDURE — ? LIQUID NITROGEN

## 2018-05-08 PROCEDURE — 99214 OFFICE O/P EST MOD 30 MIN: CPT | Mod: 25

## 2018-05-08 PROCEDURE — 17000 DESTRUCT PREMALG LESION: CPT | Mod: 59

## 2018-05-08 PROCEDURE — ? BIOPSY BY SHAVE METHOD

## 2018-05-08 PROCEDURE — ? COUNSELING

## 2018-05-08 PROCEDURE — 17110 DESTRUCTION B9 LES UP TO 14: CPT

## 2018-05-08 PROCEDURE — 11101: CPT

## 2018-05-08 PROCEDURE — 11100: CPT | Mod: 59

## 2018-05-08 ASSESSMENT — LOCATION DETAILED DESCRIPTION DERM
LOCATION DETAILED: LEFT CENTRAL FRONTAL SCALP
LOCATION DETAILED: NASAL ROOT
LOCATION DETAILED: XIPHOID
LOCATION DETAILED: RIGHT ANTERIOR PROXIMAL THIGH
LOCATION DETAILED: RIGHT INFERIOR MEDIAL UPPER BACK
LOCATION DETAILED: RIGHT SUPERIOR UPPER BACK
LOCATION DETAILED: RIGHT POSTERIOR SHOULDER
LOCATION DETAILED: RIGHT RIB CAGE
LOCATION DETAILED: RIGHT INFERIOR ANTERIOR NECK
LOCATION DETAILED: RIGHT LATERAL UPPER BACK
LOCATION DETAILED: LEFT PROXIMAL DORSAL FOREARM
LOCATION DETAILED: LEFT RADIAL DORSAL HAND
LOCATION DETAILED: LEFT SUPERIOR UPPER BACK
LOCATION DETAILED: RIGHT RADIAL DORSAL HAND
LOCATION DETAILED: LEFT RIB CAGE
LOCATION DETAILED: RIGHT DORSAL WRIST
LOCATION DETAILED: LEFT VENTRAL DISTAL FOREARM
LOCATION DETAILED: MIDDLE STERNUM
LOCATION DETAILED: RIGHT INFERIOR CENTRAL MALAR CHEEK
LOCATION DETAILED: INFERIOR THORACIC SPINE
LOCATION DETAILED: RIGHT SUPERIOR FOREHEAD
LOCATION DETAILED: LEFT MID-UPPER BACK
LOCATION DETAILED: SUBXIPHOID
LOCATION DETAILED: RIGHT SUPERIOR LATERAL NECK

## 2018-05-08 ASSESSMENT — LOCATION SIMPLE DESCRIPTION DERM
LOCATION SIMPLE: RIGHT CHEEK
LOCATION SIMPLE: RIGHT WRIST
LOCATION SIMPLE: ABDOMEN
LOCATION SIMPLE: UPPER BACK
LOCATION SIMPLE: CHEST
LOCATION SIMPLE: RIGHT FOREHEAD
LOCATION SIMPLE: LEFT HAND
LOCATION SIMPLE: LEFT FOREARM
LOCATION SIMPLE: LEFT UPPER BACK
LOCATION SIMPLE: RIGHT THIGH
LOCATION SIMPLE: NECK
LOCATION SIMPLE: RIGHT ANTERIOR NECK
LOCATION SIMPLE: LEFT SCALP
LOCATION SIMPLE: NOSE
LOCATION SIMPLE: RIGHT HAND
LOCATION SIMPLE: RIGHT SHOULDER
LOCATION SIMPLE: RIGHT UPPER BACK

## 2018-05-08 ASSESSMENT — LOCATION ZONE DERM
LOCATION ZONE: ARM
LOCATION ZONE: LEG
LOCATION ZONE: HAND
LOCATION ZONE: SCALP
LOCATION ZONE: NECK
LOCATION ZONE: NOSE
LOCATION ZONE: TRUNK
LOCATION ZONE: FACE

## 2018-05-08 NOTE — PROCEDURE: LIQUID NITROGEN
Render Post-Care Instructions In Note?: no
Detail Level: Detailed
Consent: The patient's consent was obtained including but not limited to risks of crusting, scabbing, blistering, scarring, darker or lighter pigmentary change, recurrence, incomplete removal and infection.
Post-Care Instructions: I reviewed with the patient in detail post-care instructions. Patient is to wear sunprotection, and avoid picking at any of the treated lesions. Pt may apply Vaseline to crusted or scabbing areas.
Duration Of Freeze Thaw-Cycle (Seconds): 4
Medical Necessity Information: It is in your best interest to select a reason for this procedure from the list below. All of these items fulfill various CMS LCD requirements except the new and changing color options.
Medical Necessity Clause: This procedure was medically necessary because the lesions that were treated were:

## 2018-05-08 NOTE — PROCEDURE: BIOPSY BY SHAVE METHOD
Destruction After The Procedure: No
X Size Of Lesion In Cm: 0
Dressing: Band-Aid
Consent: Written consent was obtained and risks were reviewed including but not limited to scarring, infection, bleeding, scabbing, incomplete removal, nerve damage and allergy to anesthesia.
Anesthesia Volume In Cc: 0.5
Biopsy Method: Personna blade
Billing Type: Third-Party Bill
Electrodesiccation Text: The wound bed was treated with electrodesiccation after the biopsy was performed.
Wound Care: Vaseline
Silver Nitrate Text: The wound bed was treated with silver nitrate after the biopsy was performed.
Electrodesiccation And Curettage Text: The wound bed was treated with electrodesiccation and curettage after the biopsy was performed.
Anesthesia Type: 1% lidocaine with 1:100,000 epinephrine and 408mcg clindamycin/ml and a 1:10 solution of 8.4% sodium bicarbonate
Type Of Destruction Used: Curettage
Lab Facility: 
Detail Level: Detailed
Hemostasis: Drysol and Electrocautery
Notification Instructions: Patient will be notified of biopsy results. However, patient instructed to call the office if not contacted within 2 weeks.
Post-Care Instructions: I reviewed with the patient in detail post-care instructions. Patient is to keep the biopsy site dry overnight, and then apply bacitracin twice daily until healed. Patient may apply hydrogen peroxide soaks to remove any crusting.
Was A Bandage Applied: Yes
Biopsy Type: H and E
Lab: [id number]

## 2018-07-17 ENCOUNTER — HOSPITAL ENCOUNTER (OUTPATIENT)
Dept: RADIOLOGY | Facility: MEDICAL CENTER | Age: 68
End: 2018-07-17
Attending: INTERNAL MEDICINE
Payer: MEDICARE

## 2018-07-17 DIAGNOSIS — R91.8 MULTIPLE LUNG NODULES: ICD-10-CM

## 2018-07-17 PROCEDURE — 71250 CT THORAX DX C-: CPT

## 2018-07-19 ENCOUNTER — OFFICE VISIT (OUTPATIENT)
Dept: PULMONOLOGY | Facility: HOSPICE | Age: 68
End: 2018-07-19
Payer: MEDICARE

## 2018-07-19 VITALS
RESPIRATION RATE: 16 BRPM | HEIGHT: 67 IN | OXYGEN SATURATION: 96 % | HEART RATE: 60 BPM | DIASTOLIC BLOOD PRESSURE: 60 MMHG | TEMPERATURE: 97.5 F | WEIGHT: 147 LBS | SYSTOLIC BLOOD PRESSURE: 118 MMHG | BODY MASS INDEX: 23.07 KG/M2

## 2018-07-19 DIAGNOSIS — G47.33 OSA (OBSTRUCTIVE SLEEP APNEA): ICD-10-CM

## 2018-07-19 DIAGNOSIS — R93.89 ABNORMAL CT OF THE CHEST: ICD-10-CM

## 2018-07-19 PROCEDURE — 99214 OFFICE O/P EST MOD 30 MIN: CPT | Performed by: NURSE PRACTITIONER

## 2018-07-19 NOTE — PROGRESS NOTES
Chief Complaint   Patient presents with   • Results     CT result        HPI:  Sinai Hoover is a 68 y.o. year old female here today for follow-up on her obstructive sleep apnea and abnormal CT scan of the chest. She was seen as a new patient to our office 10/26/2017 with Dr. Luis Queen. Referred for further work up on an abnormal CT scan of the chest. Apparently patient was having some disorientation and there was a concern about possible pulmonary emboli. She ended up having a CT scan of the chest PE protocol on June 2, 2017. This showed no evidence of pulmonary emboli but did show evidence of some hyperexpanded and emphysematous lungs and a 4.1 mm nodule in the right upper lobe and a 1.3 mm nodule in the right lower lobe. The patient is a prior smoker but she only smoked for about 6 months when she was in college. 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   Repeat CT chest 7/17/2018 indicates;   Stable pulmonary nodules. No new abnormalities appreciated.  Persistent small pericardial effusion.  Lucencies at T12 and L1 appear stable.    She denied any shortness of breath on exertion. She informed Dr. Queen that prior to her office visit walked to the [facility] which is 18 miles without difficulty and this was an elevation of 8000 feet. Her oxygen saturations 96% today. She denies cough, mucous, chest tightness or wheezing.     She did have complaints of daytime fatigue. She noted a history of snoring and waking gasping for air at night. She did have an overnight oximetry which showed a low oxygen saturation of 78% from a baseline oxygen saturation of 95% and the oxygen desaturation index was 9.2 per the computer algorithm.     PFT's were completed 11/2/2017 and indicates normal spirometry with an FEV1 of 2.60 L, 97% predicted with an FEV1/FVC ratio of 78 and a DLCO of 134% predicted.      Polysomnogram 12/8/2017 indicates evidence of moderate  sleep apnea with an AHI of 15.8, REM index of 19.5 with a low 02 saturation of 82%. She had fragmentation of sleep with very prolonged sleep onset latency, increased wake after sleep onset time, and a marked increase in the arousal and awakening index.   She was started on Auto CPAP 5-15 CM H20.  Compliance card download today in the office indicates an AHI of 0.6 with an average use of 7-8 hours at night. Her peak average pressure is 6.7. She tried multiple masks and is currently using a cloth mask. She does feel she sleeps better on therapy and wakes more refreshed. She denies morning headaches.       Past Medical History:   Diagnosis Date   • Hypothyroidism        Past Surgical History:   Procedure Laterality Date   • MENISCECTOMY  1994   • COLONOSCOPY  2001, 2009    recheck age 64y   • US-NEEDLE CORE BX-BREAST PANEL    2004 ?     left breast, benign       Family History   Problem Relation Age of Onset   • Alcohol/Drug Mother    • Heart Disease Father    • Alcohol/Drug Brother        Social History     Social History   • Marital status:      Spouse name: N/A   • Number of children: N/A   • Years of education: N/A     Occupational History   • Not on file.     Social History Main Topics   • Smoking status: Never Smoker   • Smokeless tobacco: Never Used   • Alcohol use Yes      Comment: rare   • Drug use: No   • Sexual activity: Not on file      Comment: , interior design, 3 daughters     Other Topics Concern   • Not on file     Social History Narrative   • No narrative on file         ROS:  Constitutional: Denies fevers, chills, sweats, fatigue, weight loss  Eyes: Denies glasses, vision loss, pain, drainage, double vision  Ears/Nose/Mouth/Throat: Denies rhinitis, nasal congestion, ear ache, difficulty hearing, sore throat, persistent hoarseness, decayed teeth/toothache  Cardiovascular: Denies chest pain, tightness, palpitations, swelling in feet/legs, fainting, difficulty breathing when laying  "down  Respiratory: See HPI   GI: Denies heartburn, difficulty swallowing, nausea, vomiting, abdominal pain, diarrhea, constipation  : Denies frequent urination, painful urination  Integumentary: Denies rashes, lumps or color changes  MSK: Denies painful joints, sore muscles, and back pain.   Neurological: Denies frequent headaches, dizziness, weakness  Sleep: See HPI     Current Outpatient Prescriptions   Medication Sig Dispense Refill   • levothyroxine (SYNTHROID) 88 MCG Tab      • imiquimod (ALDARA) 5 % cream      • valacyclovir (VALTREX) 500 MG Tab      • levothyroxine (SYNTHROID) 75 MCG TABS TAKE 1 TAB BY MOUTH EVERYDAY. 90 Tab 0     No current facility-administered medications for this visit.        Allergies   Allergen Reactions   • Celebrex [Celecoxib] Nausea     vertigo       Blood pressure 118/60, pulse 60, temperature 36.4 °C (97.5 °F), resp. rate 16, height 1.702 m (5' 7\"), weight 66.7 kg (147 lb), SpO2 96 %.    PE:   Appearance: Well developed, well nourished, no acute distress  Eyes: PERRL, EOM intact, sclera white, conjunctiva moist  Ears: no lesions or deformities  Hearing: grossly intact  Nose: no lesions or deformities  Oropharynx: tongue normal, posterior pharynx without erythema or exudate  Mallampati Classification: class 4   Neck: supple, trachea midline, no masses   Respiratory effort: no intercostal retractions or use of accessory muscles  Lung auscultation: no rales, rhonchi or wheezes  Heart auscultation: no murmur rub or gallop  Extremities: no cyanosis or edema  Abdomen: soft ,non tender, no masses  Gait and Station: normal  Digits and nails: no clubbing, cyanosis, petechiae or nodes.  Cranial nerves: grossly intact  Skin: no rashes, lesions or ulcers noted  Orientation: Oriented to time, person and place  Mood and affect: mood and affect appropriate, normal interaction with examiner  Judgement: Intact          Assessment:    1. JENNIFER (obstructive sleep apnea)     2. Abnormal CT of the " chest           Plan:    1) Continue Auto CPAP @ 5-15 CM H20.   2) Sleep hygiene discussed. Recommend keeping a set sleep/wake schedule. Logging enough hours of sleep. Limiting/Avoiding naps. No caffeine after noon and no heavy meals in the evening. Recommend daily exercise.   3) Reviewed CT scan of the chest with Dr. Marilyn Garcia. Tiny long nodules, stable x 1 year duration. Non smoker. No further follow up required.   4) Annual sleep follow up with compliance card download, sooner if needed.

## 2018-07-19 NOTE — PATIENT INSTRUCTIONS
Plan:    1) Continue Auto CPAP @ 5-15 CM H20.   2) Sleep hygiene discussed. Recommend keeping a set sleep/wake schedule. Logging enough hours of sleep. Limiting/Avoiding naps. No caffeine after noon and no heavy meals in the evening. Recommend daily exercise.   3) Reviewed CT scan of the chest with Dr. Marilyn Garcia. Tiny long nodules, stable x 1 year duration. Non smoker. No further follow up required.   4) Annual sleep follow up with compliance card download, sooner if needed.

## 2018-12-06 ENCOUNTER — APPOINTMENT (RX ONLY)
Dept: URBAN - METROPOLITAN AREA CLINIC 20 | Facility: CLINIC | Age: 68
Setting detail: DERMATOLOGY
End: 2018-12-06

## 2018-12-06 DIAGNOSIS — Z87.2 PERSONAL HISTORY OF DISEASES OF THE SKIN AND SUBCUTANEOUS TISSUE: ICD-10-CM

## 2018-12-06 DIAGNOSIS — L82.1 OTHER SEBORRHEIC KERATOSIS: ICD-10-CM

## 2018-12-06 DIAGNOSIS — L81.4 OTHER MELANIN HYPERPIGMENTATION: ICD-10-CM

## 2018-12-06 DIAGNOSIS — L72.0 EPIDERMAL CYST: ICD-10-CM

## 2018-12-06 DIAGNOSIS — Z85.828 PERSONAL HISTORY OF OTHER MALIGNANT NEOPLASM OF SKIN: ICD-10-CM

## 2018-12-06 DIAGNOSIS — D18.0 HEMANGIOMA: ICD-10-CM

## 2018-12-06 DIAGNOSIS — D22 MELANOCYTIC NEVI: ICD-10-CM

## 2018-12-06 DIAGNOSIS — L57.8 OTHER SKIN CHANGES DUE TO CHRONIC EXPOSURE TO NONIONIZING RADIATION: ICD-10-CM

## 2018-12-06 PROBLEM — D22.39 MELANOCYTIC NEVI OF OTHER PARTS OF FACE: Status: ACTIVE | Noted: 2018-12-06

## 2018-12-06 PROBLEM — D18.01 HEMANGIOMA OF SKIN AND SUBCUTANEOUS TISSUE: Status: ACTIVE | Noted: 2018-12-06

## 2018-12-06 PROBLEM — D48.5 NEOPLASM OF UNCERTAIN BEHAVIOR OF SKIN: Status: ACTIVE | Noted: 2018-12-06

## 2018-12-06 PROBLEM — E03.9 HYPOTHYROIDISM, UNSPECIFIED: Status: ACTIVE | Noted: 2018-12-06

## 2018-12-06 PROBLEM — D22.5 MELANOCYTIC NEVI OF TRUNK: Status: ACTIVE | Noted: 2018-12-06

## 2018-12-06 PROCEDURE — ? ADDITIONAL NOTES

## 2018-12-06 PROCEDURE — ? OBSERVATION AND MEASURE

## 2018-12-06 PROCEDURE — 99214 OFFICE O/P EST MOD 30 MIN: CPT

## 2018-12-06 PROCEDURE — ? COUNSELING

## 2018-12-06 ASSESSMENT — LOCATION ZONE DERM
LOCATION ZONE: TRUNK
LOCATION ZONE: LEG
LOCATION ZONE: SCALP
LOCATION ZONE: NOSE
LOCATION ZONE: NECK
LOCATION ZONE: HAND
LOCATION ZONE: FACE

## 2018-12-06 ASSESSMENT — LOCATION SIMPLE DESCRIPTION DERM
LOCATION SIMPLE: LEFT NOSE
LOCATION SIMPLE: RIGHT HAND
LOCATION SIMPLE: RIGHT THIGH
LOCATION SIMPLE: LEFT SCALP
LOCATION SIMPLE: LEFT CHEEK
LOCATION SIMPLE: RIGHT CHEEK
LOCATION SIMPLE: RIGHT UPPER BACK
LOCATION SIMPLE: RIGHT ANTERIOR NECK
LOCATION SIMPLE: LEFT UPPER BACK
LOCATION SIMPLE: LEFT HAND

## 2018-12-06 ASSESSMENT — LOCATION DETAILED DESCRIPTION DERM
LOCATION DETAILED: LEFT SUPERIOR UPPER BACK
LOCATION DETAILED: RIGHT SUPERIOR UPPER BACK
LOCATION DETAILED: RIGHT INFERIOR MEDIAL UPPER BACK
LOCATION DETAILED: LEFT NASAL SIDEWALL
LOCATION DETAILED: RIGHT RADIAL DORSAL HAND
LOCATION DETAILED: LEFT MEDIAL FRONTAL SCALP
LOCATION DETAILED: RIGHT INFERIOR ANTERIOR NECK
LOCATION DETAILED: LEFT RADIAL DORSAL HAND
LOCATION DETAILED: RIGHT ANTERIOR PROXIMAL THIGH
LOCATION DETAILED: LEFT INFERIOR CENTRAL MALAR CHEEK
LOCATION DETAILED: RIGHT INFERIOR CENTRAL MALAR CHEEK

## 2018-12-06 NOTE — PROCEDURE: OBSERVATION
Instructions (Optional): Photo in [facility] from October 2017
Size Of Lesion: 2.5mm
Detail Level: Detailed
Morphology Per Location (Optional): Tan soft papule

## 2019-01-03 ENCOUNTER — OFFICE VISIT (OUTPATIENT)
Dept: MEDICAL GROUP | Facility: MEDICAL CENTER | Age: 69
End: 2019-01-03
Payer: MEDICARE

## 2019-01-03 VITALS
WEIGHT: 145.8 LBS | BODY MASS INDEX: 22.88 KG/M2 | OXYGEN SATURATION: 96 % | SYSTOLIC BLOOD PRESSURE: 106 MMHG | HEART RATE: 64 BPM | RESPIRATION RATE: 16 BRPM | HEIGHT: 67 IN | DIASTOLIC BLOOD PRESSURE: 74 MMHG | TEMPERATURE: 98.2 F

## 2019-01-03 DIAGNOSIS — Z76.89 ESTABLISHING CARE WITH NEW DOCTOR, ENCOUNTER FOR: ICD-10-CM

## 2019-01-03 DIAGNOSIS — E03.9 ACQUIRED HYPOTHYROIDISM: ICD-10-CM

## 2019-01-03 DIAGNOSIS — G47.33 OSA (OBSTRUCTIVE SLEEP APNEA): ICD-10-CM

## 2019-01-03 PROBLEM — R07.9 CHEST PAIN: Status: RESOLVED | Noted: 2017-04-19 | Resolved: 2019-01-03

## 2019-01-03 PROBLEM — R06.02 SHORTNESS OF BREATH: Status: RESOLVED | Noted: 2017-05-04 | Resolved: 2019-01-03

## 2019-01-03 PROCEDURE — 99203 OFFICE O/P NEW LOW 30 MIN: CPT | Performed by: INTERNAL MEDICINE

## 2019-01-03 ASSESSMENT — PATIENT HEALTH QUESTIONNAIRE - PHQ9: CLINICAL INTERPRETATION OF PHQ2 SCORE: 0

## 2019-01-03 NOTE — PROGRESS NOTES
Subjective:   Sinai Hoover is a 68 y.o. female here today to establish care and        Patient is very proactive about her health, she is extremely active and enjoys paddle boarding, hiking the [email or web address], and cross-country skiing.  She still downhill skis as well.      1. Establishing care with new doctor, encounter for    Patient was previously followed by Dr. Dolores Ogden at [facility], [place] [facility]  She had thorough exam in November 2018 including mammogram, bone density, and lab work.  Everything was normal.  She had colonoscopy in 2015 and was cleared for 10 years.    2. Acquired hypothyroidism    Recent TSH normal, patient is on Synthroid 88 mcg daily.  She does not need a refill.    3. JENNIFER (obstructive sleep apnea)    Diagnosed about 1 year ago, [facility]  She has CPAP which she is very compliant in using.  She has annual follow-up in the summer.      Current medicines (including changes today)  Current Outpatient Prescriptions   Medication Sig Dispense Refill   • levothyroxine (SYNTHROID) 88 MCG Tab      • valacyclovir (VALTREX) 500 MG Tab        No current facility-administered medications for this visit.      She  has a past medical history of Hypothyroidism.    Social History     Social History   • Marital status:      Spouse name: N/A   • Number of children: N/A   • Years of education: N/A     Social History Main Topics   • Smoking status: Never Smoker   • Smokeless tobacco: Never Used   • Alcohol use Yes      Comment: rare   • Drug use: No   • Sexual activity: Yes     Partners: Male      Comment: , interior design, 3 daughters     Other Topics Concern   • Not on file     Social History Narrative   • No narrative on file     Family History   Problem Relation Age of Onset   • Alcohol/Drug Mother    • Heart Disease Father    • Alcohol/Drug Brother        ROS       - Constitutional: Negative for fever, chills,  "unexpected weight change, and fatigue/generalized weakness.     - HEENT: Negative for headaches, vision changes      - Respiratory: Negative for cough, Shortness of breath    - Cardiovascular: Negative for chest pain and bilateral lower extremity edema.     - Gastrointestinal: Negative for heartburn,  abdominal pain,  diarrhea, constipation     - Genitourinary: Negative for dysuria  and urinary incontinence.    - Musculoskeletal: Negative for  back pain and joint pain.     - Skin: Negative for rash     - Neurological: Negative for dizziness,  tremors, focal weakness     - Psychiatric/Behavioral: Negative for depression and memory loss.             Objective:     Blood pressure 106/74, pulse 64, temperature 36.8 °C (98.2 °F), temperature source Temporal, resp. rate 16, height 1.702 m (5' 7\"), weight 66.1 kg (145 lb 12.8 oz), SpO2 96 %, not currently breastfeeding. Body mass index is 22.84 kg/m².     Physical Exam:  Constitutional: Alert, no distress.  Skin: Warm, dry, good turgor, no rashes in visible areas.  Eye: Equal, round and reactive, conjunctiva clear, lids normal.  ENMT: Lips without lesions, good dentition, oropharynx clear. Hearing grossly intact.  Neck: No masses, no thyromegaly. No cervical or supraclavicular lymphadenopathy  Respiratory: Unlabored respiratory effort, lungs clear to auscultation, no wheezes, no rhonchi.  Cardiovascular: Regular rate and rhythm, without murmur, no edema.  Abdomen: Soft, non-tender, no masses, no hepatosplenomegaly.  Psych: Alert and oriented x3, normal affect and mood. Insight and judgment good            Assessment and Plan:   The following treatment plan was discussed    1. Establishing care with new doctor, encounter for    Patient is up-to-date with all of her screening measures    2. Acquired hypothyroidism    Patient has refills of her Synthroid through November    3. JENNIFER (obstructive sleep apnea)    Stable, continue follow-up with pulmonary clinic      Followup: No " Follow-up on file.

## 2019-05-16 RX ORDER — LEVOTHYROXINE SODIUM 88 UG/1
88 TABLET ORAL
Qty: 90 TAB | Refills: 1 | Status: SHIPPED | OUTPATIENT
Start: 2019-05-16 | End: 2019-11-18 | Stop reason: SDUPTHER

## 2019-06-26 ENCOUNTER — APPOINTMENT (RX ONLY)
Dept: URBAN - METROPOLITAN AREA CLINIC 4 | Facility: CLINIC | Age: 69
Setting detail: DERMATOLOGY
End: 2019-06-26

## 2019-06-26 DIAGNOSIS — Z87.2 PERSONAL HISTORY OF DISEASES OF THE SKIN AND SUBCUTANEOUS TISSUE: ICD-10-CM

## 2019-06-26 DIAGNOSIS — L82.1 OTHER SEBORRHEIC KERATOSIS: ICD-10-CM

## 2019-06-26 DIAGNOSIS — D22 MELANOCYTIC NEVI: ICD-10-CM

## 2019-06-26 DIAGNOSIS — L72.8 OTHER FOLLICULAR CYSTS OF THE SKIN AND SUBCUTANEOUS TISSUE: ICD-10-CM

## 2019-06-26 DIAGNOSIS — D18.0 HEMANGIOMA: ICD-10-CM

## 2019-06-26 DIAGNOSIS — B07.8 OTHER VIRAL WARTS: ICD-10-CM

## 2019-06-26 DIAGNOSIS — Z85.828 PERSONAL HISTORY OF OTHER MALIGNANT NEOPLASM OF SKIN: ICD-10-CM

## 2019-06-26 DIAGNOSIS — L81.4 OTHER MELANIN HYPERPIGMENTATION: ICD-10-CM

## 2019-06-26 PROBLEM — D22.5 MELANOCYTIC NEVI OF TRUNK: Status: ACTIVE | Noted: 2019-06-26

## 2019-06-26 PROBLEM — D18.01 HEMANGIOMA OF SKIN AND SUBCUTANEOUS TISSUE: Status: ACTIVE | Noted: 2019-06-26

## 2019-06-26 PROCEDURE — 10060 I&D ABSCESS SIMPLE/SINGLE: CPT

## 2019-06-26 PROCEDURE — ? LIQUID NITROGEN

## 2019-06-26 PROCEDURE — ? INCISION AND DRAINAGE

## 2019-06-26 PROCEDURE — ? SUNSCREEN RECOMMENDATIONS

## 2019-06-26 PROCEDURE — 99213 OFFICE O/P EST LOW 20 MIN: CPT | Mod: 25

## 2019-06-26 PROCEDURE — ? COUNSELING

## 2019-06-26 PROCEDURE — 17110 DESTRUCTION B9 LES UP TO 14: CPT

## 2019-06-26 ASSESSMENT — LOCATION SIMPLE DESCRIPTION DERM
LOCATION SIMPLE: LEFT ANTERIOR NECK
LOCATION SIMPLE: RIGHT LOWER BACK
LOCATION SIMPLE: LEFT UPPER BACK
LOCATION SIMPLE: ABDOMEN
LOCATION SIMPLE: UPPER BACK
LOCATION SIMPLE: RIGHT THUMB
LOCATION SIMPLE: LEFT INDEX FINGER
LOCATION SIMPLE: LEFT SUPERIOR LIP
LOCATION SIMPLE: RIGHT THIGH

## 2019-06-26 ASSESSMENT — LOCATION ZONE DERM
LOCATION ZONE: LEG
LOCATION ZONE: TRUNK
LOCATION ZONE: NECK
LOCATION ZONE: FINGER
LOCATION ZONE: LIP

## 2019-06-26 ASSESSMENT — LOCATION DETAILED DESCRIPTION DERM
LOCATION DETAILED: SUPERIOR THORACIC SPINE
LOCATION DETAILED: RIGHT RIB CAGE
LOCATION DETAILED: RIGHT DISTAL RADIAL THUMB
LOCATION DETAILED: LEFT MID RADIAL DORSAL INDEX FINGER
LOCATION DETAILED: LEFT SUPERIOR VERMILION LIP
LOCATION DETAILED: RIGHT SUPERIOR MEDIAL MIDBACK
LOCATION DETAILED: LEFT SUPERIOR MEDIAL UPPER BACK
LOCATION DETAILED: RIGHT ANTERIOR PROXIMAL THIGH
LOCATION DETAILED: LEFT INFERIOR ANTERIOR NECK

## 2019-06-26 NOTE — PROCEDURE: LIQUID NITROGEN
Post-Care Instructions: I reviewed with the patient in detail post-care instructions. Patient is to wear sunprotection, and avoid picking at any of the treated lesions. Pt may apply Vaseline to crusted or scabbing areas.
Render Post-Care Instructions In Note?: no
Consent: The patient's consent was obtained including but not limited to risks of crusting, scabbing, blistering, scarring, darker or lighter pigmentary change, recurrence, incomplete removal and infection.
Detail Level: Detailed
Medical Necessity Information: It is in your best interest to select a reason for this procedure from the list below. All of these items fulfill various CMS LCD requirements except the new and changing color options.
Medical Necessity Clause: This procedure was medically necessary because the lesions that were treated were: inflamed and itchy and very bothersome

## 2019-06-26 NOTE — PROCEDURE: INCISION AND DRAINAGE
Lesion Type: Cyst
Consent was obtained and risks were reviewed including but not limited to delayed wound healing, infection, need for multiple I and D's, and pain.
Epidermal Sutures: 4-0 Ethilon
Preparation Text: The area was prepped in the usual clean fashion.
Epidermal Closure: simple interrupted
Curette: No
Method: sterile needle
Size Of Lesion In Cm (Optional But May Be Required For Some Insurances): 0
Wound Care: Petrolatum
Curette Text (Optional): After the contents were expressed a curette was used to partially remove the cyst wall.
Suture Text: The incision was partially closed with
Post-Care Instructions: I reviewed with the patient in detail post-care instructions. Patient should keep wound covered and call the office should any redness, pain, swelling or worsening occur.
Detail Level: Detailed
Anesthesia Type: 1% lidocaine with epinephrine and a 1:10 solution of 8.4% sodium bicarbonate
Dressing: dry sterile dressing

## 2019-09-12 ENCOUNTER — OFFICE VISIT (OUTPATIENT)
Dept: MEDICAL GROUP | Facility: MEDICAL CENTER | Age: 69
End: 2019-09-12
Payer: MEDICARE

## 2019-09-12 VITALS
HEART RATE: 62 BPM | RESPIRATION RATE: 14 BRPM | OXYGEN SATURATION: 98 % | BODY MASS INDEX: 20.31 KG/M2 | WEIGHT: 134 LBS | HEIGHT: 68 IN | DIASTOLIC BLOOD PRESSURE: 64 MMHG | TEMPERATURE: 97.9 F | SYSTOLIC BLOOD PRESSURE: 106 MMHG

## 2019-09-12 DIAGNOSIS — B00.9 HSV INFECTION: ICD-10-CM

## 2019-09-12 DIAGNOSIS — Z13.6 SCREENING FOR ISCHEMIC HEART DISEASE: ICD-10-CM

## 2019-09-12 DIAGNOSIS — E03.9 HYPOTHYROIDISM, UNSPECIFIED TYPE: ICD-10-CM

## 2019-09-12 DIAGNOSIS — Z00.00 PREVENTATIVE HEALTH CARE: ICD-10-CM

## 2019-09-12 DIAGNOSIS — Z86.2 HISTORY OF ANEMIA: ICD-10-CM

## 2019-09-12 DIAGNOSIS — B07.9 WART ON THUMB: ICD-10-CM

## 2019-09-12 DIAGNOSIS — L65.9 HAIR THINNING: ICD-10-CM

## 2019-09-12 PROCEDURE — 17110 DESTRUCTION B9 LES UP TO 14: CPT | Performed by: FAMILY MEDICINE

## 2019-09-12 PROCEDURE — 99202 OFFICE O/P NEW SF 15 MIN: CPT | Mod: 25 | Performed by: FAMILY MEDICINE

## 2019-09-12 NOTE — LETTER
[facility]  Nilda Pinto M.D.  [address]  [place] [address]  Fax: [phone]   Authorization for Release/Disclosure of   Protected Health Information   Name: HOANG HOOVER : 1950 SSN: [id number]   Address: [address] [address]  [place] [address] Phone:    [phone] (home)    I authorize the entity listed below to release/disclose the PHI below to:   [facility]/Nilda Pinto M.D. and Nilda Pinto M.D.   Provider or Entity Name:  Dr. Lizama    Address   [facility], [facility], [facility]   Phone:      Fax:     Reason for request: continuity of care   Information to be released:    [  ] LAST COLONOSCOPY,  including any PATH REPORT and follow-up  [  ] LAST FIT/COLOGUARD RESULT [  ] LAST DEXA  [  ] LAST MAMMOGRAM  [X  ] LAST PAP  [  ] LAST LABS [  ] RETINA EXAM REPORT  [  ] IMMUNIZATION RECORDS  [  ] Release all info      [  ] Check here and initial the line next to each item to release ALL health information INCLUDING  _____ Care and treatment for drug and / or alcohol abuse  _____ HIV testing, infection status, or AIDS  _____ Genetic Testing    DATES OF SERVICE OR TIME PERIOD TO BE DISCLOSED: _____________  I understand and acknowledge that:  * This Authorization may be revoked at any time by you in writing, except if your health information has already been used or disclosed.  * Your health information that will be used or disclosed as a result of you signing this authorization could be re-disclosed by the recipient. If this occurs, your re-disclosed health information may no longer be protected by State or Federal laws.  * You may refuse to sign this Authorization. Your refusal will not affect your ability to obtain treatment.  * This Authorization becomes effective upon signing and will  on (date) __________.      If no date is indicated, this Authorization will  one (1) year from the signature date.    Name: Hoang Hoover    Signature:   Date:     2019       PLEASE FAX REQUESTED  RECORDS BACK TO: [phone]

## 2019-09-12 NOTE — ASSESSMENT & PLAN NOTE
Would like to check iron and h/H    Diet related doesn't eat a lot of red meat   Usually nearly vegan    Will check b12 also

## 2019-09-12 NOTE — ASSESSMENT & PLAN NOTE
Evaluation this is wart   I explain that wart on hand and feet palmar is very hard to txt given that skin is 10X thicker  She is requesting cryo     Procedure note  Risk benefit side effect discussed  Cryo applied 2 freeze thaw cycles   Patient tolerated procedure well    She will also consult with her dermatologist lambert caba or enzyme

## 2019-09-12 NOTE — PROGRESS NOTES
This medical record contains text that has been entered with the assistance of computer voice recognition and dictation software.  Therefore, it may contain unintended errors in text, spelling, punctuation, or grammar        Chief Complaint   Patient presents with   • Establish Care     needs new pcp   • Orders Needed     tsh due to hair loss/ dry skin and tired    • Skin Lesion     rt  hand thumb to freeze        Sinai Hoover is a 69 y.o. 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 90 Tab 1   • valacyclovir (VALTREX) 500 MG Tab        No current facility-administered medications for this visit.      Patient Active Problem List    Diagnosis Date Noted   • HSV infection 09/12/2019   • History of anemia 09/12/2019   • Wart on thumb 09/12/2019   • JENNIFER (obstructive sleep apnea) 02/14/2018   • Hypothyroidism      Past Surgical History:   Procedure Laterality Date   • MENISCECTOMY  1994   • COLONOSCOPY  2001, 2009    recheck age 64y   • HIP REPLACEMENT, TOTAL Left    • US-NEEDLE CORE BX-BREAST PANEL    2004 ?     left breast, benign      Social History     Tobacco Use   • Smoking status: Never Smoker   • Smokeless tobacco: Never Used   Substance Use Topics   • Alcohol use: Yes     Comment: rare   • Drug use: No     Family History   Problem Relation Age of Onset   • Alcohol/Drug Mother    • Heart Disease Father    • Alcohol/Drug Brother            ROS  Hair thinning  Fatigue   Has been doing an AM boot camp exercise class   all review of system completed and negative except for those listed above     Objective:  "    /64 (BP Location: Right arm, Patient Position: Sitting, BP Cuff Size: Adult)   Pulse 62   Temp 36.6 °C (97.9 °F) (Temporal)   Resp 14   Ht 1.727 m (5' 8\")   Wt 60.8 kg (134 lb)   SpO2 98%  Body mass index is 20.37 kg/m².  Physical Exam:    Constitutional: Alert, no distress.  Skin: Warm, dry, good turgor, no rashes in visible areas.  Eye: Equal, round and reactive, conjunctiva clear, lids normal.  ENMT: Lips without lesions, good dentition, oropharynx clear.  Neck: Trachea midline, no masses, no thyromegaly. No cervical or supraclavicular lymphadenopathy.  Respiratory: Unlabored respiratory effort, lungs clear to auscultation, no wheezes, no ronchi.  Cardiovascular: Normal S1, S2, no murmur, no edema.  Abdomen: Soft, non-tender, no masses, no hepatosplenomegaly.  Psych: Alert and oriented x3, normal affect and mood.    See below for skin lesion R thumb         Assessment and Plan:   The following treatment plan was discussed        Problem List Items Addressed This Visit     Hypothyroidism     Will check TSH   Continue synthroid              Relevant Orders    TSH    Lipid Profile    Basic Metabolic Panel    HSV infection     Takes valtrex as needed only            History of anemia     Would like to check iron and h/H    Diet related doesn't eat a lot of red meat   Usually nearly vegan    Will check b12 also              Relevant Orders    HEMOGLOBIN AND HEMATOCRIT    Wart on thumb     Evaluation this is wart   I explain that wart on hand and feet palmar is very hard to txt given that skin is 10X thicker  She is requesting cryo     Procedure note  Risk benefit side effect discussed  Cryo applied 2 freeze thaw cycles   Patient tolerated procedure well    She will also consult with her dermatologist lambert caba or enzyme               Other Visit Diagnoses     Screening for ischemic heart disease        Relevant Orders    Lipid Profile    Basic Metabolic Panel    Hair thinning        Relevant Orders "    IRON/TOTAL IRON BIND    VITAMIN B12    TSH    HEMOGLOBIN AND HEMATOCRIT    Preventative health care        Relevant Orders    Lipid Profile    Basic Metabolic Panel                Instructed to follow up if symptoms worsen or fail to improve, ER/UC precautions discussed as well    Nilda Pinto MD  [facility], [address]   [place] [address]  Phone: [phone]

## 2019-09-16 ENCOUNTER — HOSPITAL ENCOUNTER (OUTPATIENT)
Dept: LAB | Facility: MEDICAL CENTER | Age: 69
End: 2019-09-16
Attending: FAMILY MEDICINE
Payer: MEDICARE

## 2019-09-16 DIAGNOSIS — L65.9 HAIR THINNING: ICD-10-CM

## 2019-09-16 DIAGNOSIS — Z86.2 HISTORY OF ANEMIA: ICD-10-CM

## 2019-09-16 DIAGNOSIS — Z00.00 PREVENTATIVE HEALTH CARE: ICD-10-CM

## 2019-09-16 LAB
ANION GAP SERPL CALC-SCNC: 7 MMOL/L (ref 0–11.9)
BUN SERPL-MCNC: 17 MG/DL (ref 8–22)
CALCIUM SERPL-MCNC: 9.3 MG/DL (ref 8.5–10.5)
CHLORIDE SERPL-SCNC: 107 MMOL/L (ref 96–112)
CHOLEST SERPL-MCNC: 159 MG/DL (ref 100–199)
CO2 SERPL-SCNC: 30 MMOL/L (ref 20–33)
CREAT SERPL-MCNC: 0.71 MG/DL (ref 0.5–1.4)
FASTING STATUS PATIENT QL REPORTED: NORMAL
GLUCOSE SERPL-MCNC: 87 MG/DL (ref 65–99)
HCT VFR BLD AUTO: 42.5 % (ref 37–47)
HDLC SERPL-MCNC: 60 MG/DL
HGB BLD-MCNC: 13 G/DL (ref 12–16)
IRON SATN MFR SERPL: 22 % (ref 15–55)
IRON SERPL-MCNC: 60 UG/DL (ref 40–170)
LDLC SERPL CALC-MCNC: 87 MG/DL
POTASSIUM SERPL-SCNC: 4 MMOL/L (ref 3.6–5.5)
SODIUM SERPL-SCNC: 144 MMOL/L (ref 135–145)
TIBC SERPL-MCNC: 272 UG/DL (ref 250–450)
TRIGL SERPL-MCNC: 59 MG/DL (ref 0–149)
TSH SERPL DL<=0.005 MIU/L-ACNC: 3.47 UIU/ML (ref 0.38–5.33)
VIT B12 SERPL-MCNC: 255 PG/ML (ref 211–911)

## 2019-09-16 PROCEDURE — 83550 IRON BINDING TEST: CPT

## 2019-09-16 PROCEDURE — 83540 ASSAY OF IRON: CPT

## 2019-09-16 PROCEDURE — 80061 LIPID PANEL: CPT | Mod: GA

## 2019-09-16 PROCEDURE — 84443 ASSAY THYROID STIM HORMONE: CPT

## 2019-09-16 PROCEDURE — 85014 HEMATOCRIT: CPT

## 2019-09-16 PROCEDURE — 80048 BASIC METABOLIC PNL TOTAL CA: CPT

## 2019-09-16 PROCEDURE — 36415 COLL VENOUS BLD VENIPUNCTURE: CPT

## 2019-09-16 PROCEDURE — 82607 VITAMIN B-12: CPT

## 2019-09-16 PROCEDURE — 85018 HEMOGLOBIN: CPT

## 2019-09-30 ENCOUNTER — HOSPITAL ENCOUNTER (OUTPATIENT)
Dept: RADIOLOGY | Facility: MEDICAL CENTER | Age: 69
End: 2019-09-30
Attending: ORTHOPAEDIC SURGERY
Payer: MEDICARE

## 2019-09-30 DIAGNOSIS — S83.281A TEAR OF LATERAL MENISCUS OF RIGHT KNEE, CURRENT, UNSPECIFIED TEAR TYPE, INITIAL ENCOUNTER: ICD-10-CM

## 2019-09-30 PROCEDURE — 73721 MRI JNT OF LWR EXTRE W/O DYE: CPT | Mod: RT

## 2019-11-11 ENCOUNTER — TELEPHONE (OUTPATIENT)
Dept: MEDICAL GROUP | Facility: MEDICAL CENTER | Age: 69
End: 2019-11-11

## 2019-11-11 NOTE — TELEPHONE ENCOUNTER
Patient called in this morning requesting a letter be sent to Dr Gr's office, she is scheduled to have a knee arthroscopy next week and they just need a letter stating that she is well and ok to have this procedure done.    Please advise if this can be done, I told the patient that I would contact her if there are any issues. She will be coming in tomorrow for a MA visit.      Pt last seen 9/12/19

## 2019-11-11 NOTE — TELEPHONE ENCOUNTER
Phone Number Called: [phone] (home)       Call outcome: left message for patient to call back regarding message below    Message: LVM for pt to call back to schedule an appointment for pre op clearance

## 2019-11-13 ENCOUNTER — OFFICE VISIT (OUTPATIENT)
Dept: MEDICAL GROUP | Facility: MEDICAL CENTER | Age: 69
End: 2019-11-13
Payer: MEDICARE

## 2019-11-13 VITALS
RESPIRATION RATE: 14 BRPM | HEIGHT: 68 IN | HEART RATE: 78 BPM | WEIGHT: 137 LBS | OXYGEN SATURATION: 98 % | TEMPERATURE: 97.2 F | BODY MASS INDEX: 20.76 KG/M2 | DIASTOLIC BLOOD PRESSURE: 70 MMHG | SYSTOLIC BLOOD PRESSURE: 106 MMHG

## 2019-11-13 DIAGNOSIS — Z23 NEED FOR VACCINATION: ICD-10-CM

## 2019-11-13 DIAGNOSIS — G47.33 OSA (OBSTRUCTIVE SLEEP APNEA): ICD-10-CM

## 2019-11-13 DIAGNOSIS — Z01.818 PREOP EXAMINATION: ICD-10-CM

## 2019-11-13 PROCEDURE — 90670 PCV13 VACCINE IM: CPT | Performed by: FAMILY MEDICINE

## 2019-11-13 PROCEDURE — 99213 OFFICE O/P EST LOW 20 MIN: CPT | Mod: 25 | Performed by: FAMILY MEDICINE

## 2019-11-13 PROCEDURE — G0009 ADMIN PNEUMOCOCCAL VACCINE: HCPCS | Performed by: FAMILY MEDICINE

## 2019-11-13 NOTE — PROGRESS NOTES
This medical record contains text that has been entered with the assistance of computer voice recognition and dictation software.  Therefore, it may contain unintended errors in text, spelling, punctuation, or grammar        Chief Complaint   Patient presents with   • Medical Clearance     knee rt knee surgery        Sinai Hoover is a 69 y.o. 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 90 Tab 1   • valacyclovir (VALTREX) 500 MG Tab        No current facility-administered medications for this visit.      Patient Active Problem List    Diagnosis Date Noted   • Preop examination 11/13/2019   • HSV infection 09/12/2019   • History of anemia 09/12/2019   • Wart on thumb 09/12/2019   • JENNIFER (obstructive sleep apnea) 02/14/2018   • Hypothyroidism      Past Surgical History:   Procedure Laterality Date   • MENISCECTOMY  1994   • COLONOSCOPY  2001, 2009    recheck age 64y   • HIP REPLACEMENT, TOTAL Left    • US-NEEDLE CORE BX-BREAST PANEL    2004 ?     left breast, benign      Social History     Tobacco Use   • Smoking status: Never Smoker   • Smokeless tobacco: Never Used   Substance Use Topics   • Alcohol use: Yes     Comment: rare   • Drug use: No     Family History   Problem Relation Age of Onset   • Alcohol/Drug Mother    • Heart Disease Father    • Alcohol/Drug Brother            ROS  Hair thinning  Fatigue   Has been doing an AM boot camp exercise class   all review of system completed and negative except for those listed above     Objective:     /70 (BP Location: Right arm, Patient  "Position: Sitting, BP Cuff Size: Adult)   Pulse 78   Temp 36.2 °C (97.2 °F) (Temporal)   Resp 14   Ht 1.727 m (5' 8\")   Wt 62.1 kg (137 lb)   SpO2 98%  Body mass index is 20.83 kg/m².  Physical Exam:    Constitutional: Alert, no distress.  Skin: Warm, dry, good turgor, no rashes in visible areas.  Eye: Equal, round and reactive, conjunctiva clear, lids normal.  ENMT: Lips without lesions, good dentition, oropharynx clear.  Neck: Trachea midline, no masses, no thyromegaly. No cervical or supraclavicular lymphadenopathy.  Respiratory: Unlabored respiratory effort, lungs clear to auscultation, no wheezes, no ronchi.  Cardiovascular: Normal S1, S2, no murmur, no edema.  Abdomen: Soft, non-tender, no masses, no hepatosplenomegaly.  Psych: Alert and oriented x3, normal affect and mood.              Assessment and Plan:   The following treatment plan was discussed        Problem List Items Addressed This Visit     JENNIFER (obstructive sleep apnea)     New problem  We should have her re-est with sleep clinic            Relevant Orders    REFERRAL TO PULMONOLOGY    REFERRAL TO SLEEP STUDIES    Preop examination     Consultation requested by Dr. Gr  Meniscus repair R knee     Blood pressure is well controlled.  Continue current medications at current dose as above. Refills were prescribed today and patient was instructed with plan of care.      Patient is not in acute coronary syndrome presentation, is not having any active TIA or stroke symptoms, as not having decompensated heart failure, is not symptomatic in terms of presyncope or syncope.  No evidence of significant valvular disease.      I explained to patient that further cardiac testing or procedures will not lower her overall cardiovascular risk for the surgery.    Patient remains low to moderate cardiovascular risk for her intended surgery of right  knee. She should proceed with intended surgery.                  Other Visit Diagnoses     Need for vaccination    "     Relevant Orders    Prevnar-13 Vaccine (PCV13)                Instructed to follow up if symptoms worsen or fail to improve, ER/UC precautions discussed as well    Nilda Pinto MD  [facility], Family Medicine   [address] [place] [address]   [place] [address]  Phone: [phone]

## 2019-11-13 NOTE — LETTER
Consultation requested by Dr. Gr  Meniscus repair R knee     Blood pressure is well controlled.  Continue current medications at current dose as above. Refills were prescribed today and patient was instructed with plan of care.      Patient is not in acute coronary syndrome presentation, is not having any active TIA or stroke symptoms, as not having decompensated heart failure, is not symptomatic in terms of presyncope or syncope.  No evidence of significant valvular disease.      I explained to patient that further cardiac testing or procedures will not lower her overall cardiovascular risk for the surgery.    Patient remains low to moderate cardiovascular risk for her intended surgery of right knee. She should proceed with intended surgery.     Best,   Dr. Nilda Pinto MD

## 2019-11-13 NOTE — ASSESSMENT & PLAN NOTE
Consultation requested by Dr. rG  Meniscus repair R knee     Blood pressure is well controlled.  Continue current medications at current dose as above. Refills were prescribed today and patient was instructed with plan of care.      Patient is not in acute coronary syndrome presentation, is not having any active TIA or stroke symptoms, as not having decompensated heart failure, is not symptomatic in terms of presyncope or syncope.  No evidence of significant valvular disease.      I explained to patient that further cardiac testing or procedures will not lower her overall cardiovascular risk for the surgery.    Patient remains low to moderate cardiovascular risk for her intended surgery of right knee. She should proceed with intended surgery.

## 2019-11-18 RX ORDER — LEVOTHYROXINE SODIUM 88 UG/1
88 TABLET ORAL
Qty: 90 TAB | Refills: 1 | Status: SHIPPED | OUTPATIENT
Start: 2019-11-18 | End: 2020-05-15

## 2019-11-18 NOTE — TELEPHONE ENCOUNTER
Was the patient seen in the last year in this department? Yes    Does patient have an active prescription for medications requested? Yes    Received Request Via: Pharmacy     Future Appointments       Provider Department Center    11/20/2019 1:15 PM PREADMIT  2 PREADMIT TESTS [facility]

## 2019-11-20 DIAGNOSIS — Z01.812 PRE-OPERATIVE LABORATORY EXAMINATION: ICD-10-CM

## 2019-11-20 LAB
ERYTHROCYTE [DISTWIDTH] IN BLOOD BY AUTOMATED COUNT: 42.6 FL (ref 35.9–50)
HCT VFR BLD AUTO: 40.3 % (ref 37–47)
HGB BLD-MCNC: 13.1 G/DL (ref 12–16)
MCH RBC QN AUTO: 29.4 PG (ref 27–33)
MCHC RBC AUTO-ENTMCNC: 32.5 G/DL (ref 33.6–35)
MCV RBC AUTO: 90.6 FL (ref 81.4–97.8)
PLATELET # BLD AUTO: 182 K/UL (ref 164–446)
PMV BLD AUTO: 12 FL (ref 9–12.9)
RBC # BLD AUTO: 4.45 M/UL (ref 4.2–5.4)
SCCMEC + MECA PNL NOSE NAA+PROBE: NEGATIVE
SCCMEC + MECA PNL NOSE NAA+PROBE: NEGATIVE
WBC # BLD AUTO: 3.7 K/UL (ref 4.8–10.8)

## 2019-11-20 PROCEDURE — 85027 COMPLETE CBC AUTOMATED: CPT

## 2019-11-20 PROCEDURE — 36415 COLL VENOUS BLD VENIPUNCTURE: CPT

## 2019-11-20 PROCEDURE — 87640 STAPH A DNA AMP PROBE: CPT | Mod: XU

## 2019-11-20 PROCEDURE — 87641 MR-STAPH DNA AMP PROBE: CPT

## 2019-11-26 ENCOUNTER — ANESTHESIA (OUTPATIENT)
Dept: SURGERY | Facility: MEDICAL CENTER | Age: 69
End: 2019-11-26
Payer: MEDICARE

## 2019-11-26 ENCOUNTER — ANESTHESIA EVENT (OUTPATIENT)
Dept: SURGERY | Facility: MEDICAL CENTER | Age: 69
End: 2019-11-26
Payer: MEDICARE

## 2019-11-26 ENCOUNTER — HOSPITAL ENCOUNTER (OUTPATIENT)
Facility: MEDICAL CENTER | Age: 69
End: 2019-11-26
Attending: ORTHOPAEDIC SURGERY | Admitting: ORTHOPAEDIC SURGERY
Payer: MEDICARE

## 2019-11-26 VITALS
WEIGHT: 134.7 LBS | HEART RATE: 74 BPM | DIASTOLIC BLOOD PRESSURE: 84 MMHG | OXYGEN SATURATION: 95 % | BODY MASS INDEX: 20.41 KG/M2 | SYSTOLIC BLOOD PRESSURE: 145 MMHG | TEMPERATURE: 97.7 F | RESPIRATION RATE: 16 BRPM | HEIGHT: 68 IN

## 2019-11-26 PROCEDURE — 160048 HCHG OR STATISTICAL LEVEL 1-5: Performed by: ORTHOPAEDIC SURGERY

## 2019-11-26 PROCEDURE — 500028 HCHG ARTHROWAND TURBOVAC 3.5/90 SUCT.: Performed by: ORTHOPAEDIC SURGERY

## 2019-11-26 PROCEDURE — 700111 HCHG RX REV CODE 636 W/ 250 OVERRIDE (IP): Performed by: ANESTHESIOLOGY

## 2019-11-26 PROCEDURE — 160041 HCHG SURGERY MINUTES - EA ADDL 1 MIN LEVEL 4: Performed by: ORTHOPAEDIC SURGERY

## 2019-11-26 PROCEDURE — 700105 HCHG RX REV CODE 258: Performed by: ORTHOPAEDIC SURGERY

## 2019-11-26 PROCEDURE — 160035 HCHG PACU - 1ST 60 MINS PHASE I: Performed by: ORTHOPAEDIC SURGERY

## 2019-11-26 PROCEDURE — 502580 HCHG PACK, KNEE ARTHROSCOPY: Performed by: ORTHOPAEDIC SURGERY

## 2019-11-26 PROCEDURE — 160002 HCHG RECOVERY MINUTES (STAT): Performed by: ORTHOPAEDIC SURGERY

## 2019-11-26 PROCEDURE — 160009 HCHG ANES TIME/MIN: Performed by: ORTHOPAEDIC SURGERY

## 2019-11-26 PROCEDURE — A9270 NON-COVERED ITEM OR SERVICE: HCPCS | Performed by: ANESTHESIOLOGY

## 2019-11-26 PROCEDURE — 160025 RECOVERY II MINUTES (STATS): Performed by: ORTHOPAEDIC SURGERY

## 2019-11-26 PROCEDURE — A6222 GAUZE <=16 IN NO W/SAL W/O B: HCPCS | Performed by: ORTHOPAEDIC SURGERY

## 2019-11-26 PROCEDURE — 160036 HCHG PACU - EA ADDL 30 MINS PHASE I: Performed by: ORTHOPAEDIC SURGERY

## 2019-11-26 PROCEDURE — 501838 HCHG SUTURE GENERAL: Performed by: ORTHOPAEDIC SURGERY

## 2019-11-26 PROCEDURE — 700111 HCHG RX REV CODE 636 W/ 250 OVERRIDE (IP): Performed by: ORTHOPAEDIC SURGERY

## 2019-11-26 PROCEDURE — 160046 HCHG PACU - 1ST 60 MINS PHASE II: Performed by: ORTHOPAEDIC SURGERY

## 2019-11-26 PROCEDURE — 700102 HCHG RX REV CODE 250 W/ 637 OVERRIDE(OP): Performed by: ANESTHESIOLOGY

## 2019-11-26 PROCEDURE — 700101 HCHG RX REV CODE 250: Performed by: ANESTHESIOLOGY

## 2019-11-26 PROCEDURE — 160029 HCHG SURGERY MINUTES - 1ST 30 MINS LEVEL 4: Performed by: ORTHOPAEDIC SURGERY

## 2019-11-26 RX ORDER — ONDANSETRON 2 MG/ML
4 INJECTION INTRAMUSCULAR; INTRAVENOUS
Status: DISCONTINUED | OUTPATIENT
Start: 2019-11-26 | End: 2019-11-26 | Stop reason: HOSPADM

## 2019-11-26 RX ORDER — ONDANSETRON 2 MG/ML
INJECTION INTRAMUSCULAR; INTRAVENOUS PRN
Status: DISCONTINUED | OUTPATIENT
Start: 2019-11-26 | End: 2019-11-26 | Stop reason: SURG

## 2019-11-26 RX ORDER — HYDROMORPHONE HYDROCHLORIDE 1 MG/ML
0.4 INJECTION, SOLUTION INTRAMUSCULAR; INTRAVENOUS; SUBCUTANEOUS
Status: DISCONTINUED | OUTPATIENT
Start: 2019-11-26 | End: 2019-11-26 | Stop reason: HOSPADM

## 2019-11-26 RX ORDER — OXYCODONE HCL 5 MG/5 ML
5 SOLUTION, ORAL ORAL
Status: COMPLETED | OUTPATIENT
Start: 2019-11-26 | End: 2019-11-26

## 2019-11-26 RX ORDER — HALOPERIDOL 5 MG/ML
1 INJECTION INTRAMUSCULAR
Status: DISCONTINUED | OUTPATIENT
Start: 2019-11-26 | End: 2019-11-26 | Stop reason: HOSPADM

## 2019-11-26 RX ORDER — CEFAZOLIN SODIUM 1 G/3ML
INJECTION, POWDER, FOR SOLUTION INTRAMUSCULAR; INTRAVENOUS PRN
Status: DISCONTINUED | OUTPATIENT
Start: 2019-11-26 | End: 2019-11-26 | Stop reason: SURG

## 2019-11-26 RX ORDER — SODIUM CHLORIDE, SODIUM LACTATE, POTASSIUM CHLORIDE, CALCIUM CHLORIDE 600; 310; 30; 20 MG/100ML; MG/100ML; MG/100ML; MG/100ML
INJECTION, SOLUTION INTRAVENOUS CONTINUOUS
Status: DISCONTINUED | OUTPATIENT
Start: 2019-11-26 | End: 2019-11-26 | Stop reason: HOSPADM

## 2019-11-26 RX ORDER — OXYCODONE HCL 5 MG/5 ML
10 SOLUTION, ORAL ORAL
Status: COMPLETED | OUTPATIENT
Start: 2019-11-26 | End: 2019-11-26

## 2019-11-26 RX ORDER — HYDROMORPHONE HYDROCHLORIDE 1 MG/ML
0.2 INJECTION, SOLUTION INTRAMUSCULAR; INTRAVENOUS; SUBCUTANEOUS
Status: DISCONTINUED | OUTPATIENT
Start: 2019-11-26 | End: 2019-11-26 | Stop reason: HOSPADM

## 2019-11-26 RX ORDER — LABETALOL HYDROCHLORIDE 5 MG/ML
5 INJECTION, SOLUTION INTRAVENOUS
Status: DISCONTINUED | OUTPATIENT
Start: 2019-11-26 | End: 2019-11-26 | Stop reason: HOSPADM

## 2019-11-26 RX ORDER — DEXAMETHASONE SODIUM PHOSPHATE 4 MG/ML
INJECTION, SOLUTION INTRA-ARTICULAR; INTRALESIONAL; INTRAMUSCULAR; INTRAVENOUS; SOFT TISSUE PRN
Status: DISCONTINUED | OUTPATIENT
Start: 2019-11-26 | End: 2019-11-26 | Stop reason: SURG

## 2019-11-26 RX ORDER — HYDRALAZINE HYDROCHLORIDE 20 MG/ML
5 INJECTION INTRAMUSCULAR; INTRAVENOUS
Status: DISCONTINUED | OUTPATIENT
Start: 2019-11-26 | End: 2019-11-26 | Stop reason: HOSPADM

## 2019-11-26 RX ORDER — BUPIVACAINE HYDROCHLORIDE 5 MG/ML
INJECTION, SOLUTION PERINEURAL
Status: DISCONTINUED | OUTPATIENT
Start: 2019-11-26 | End: 2019-11-26 | Stop reason: HOSPADM

## 2019-11-26 RX ORDER — LIDOCAINE HYDROCHLORIDE 20 MG/ML
INJECTION, SOLUTION EPIDURAL; INFILTRATION; INTRACAUDAL; PERINEURAL PRN
Status: DISCONTINUED | OUTPATIENT
Start: 2019-11-26 | End: 2019-11-26 | Stop reason: SURG

## 2019-11-26 RX ORDER — HYDROMORPHONE HYDROCHLORIDE 1 MG/ML
0.1 INJECTION, SOLUTION INTRAMUSCULAR; INTRAVENOUS; SUBCUTANEOUS
Status: DISCONTINUED | OUTPATIENT
Start: 2019-11-26 | End: 2019-11-26 | Stop reason: HOSPADM

## 2019-11-26 RX ORDER — MEPERIDINE HYDROCHLORIDE 25 MG/ML
6.25 INJECTION INTRAMUSCULAR; INTRAVENOUS; SUBCUTANEOUS
Status: DISCONTINUED | OUTPATIENT
Start: 2019-11-26 | End: 2019-11-26 | Stop reason: HOSPADM

## 2019-11-26 RX ORDER — DIPHENHYDRAMINE HYDROCHLORIDE 50 MG/ML
12.5 INJECTION INTRAMUSCULAR; INTRAVENOUS
Status: DISCONTINUED | OUTPATIENT
Start: 2019-11-26 | End: 2019-11-26 | Stop reason: HOSPADM

## 2019-11-26 RX ORDER — ACETAMINOPHEN 500 MG
1000 TABLET ORAL ONCE
Status: COMPLETED | OUTPATIENT
Start: 2019-11-26 | End: 2019-11-26

## 2019-11-26 RX ADMIN — DEXAMETHASONE SODIUM PHOSPHATE 8 MG: 4 INJECTION, SOLUTION INTRAMUSCULAR; INTRAVENOUS at 12:31

## 2019-11-26 RX ADMIN — SODIUM CHLORIDE, POTASSIUM CHLORIDE, SODIUM LACTATE AND CALCIUM CHLORIDE: 600; 310; 30; 20 INJECTION, SOLUTION INTRAVENOUS at 11:35

## 2019-11-26 RX ADMIN — OXYCODONE HYDROCHLORIDE 5 MG: 5 SOLUTION ORAL at 14:11

## 2019-11-26 RX ADMIN — FENTANYL CITRATE 50 MCG: 50 INJECTION, SOLUTION INTRAMUSCULAR; INTRAVENOUS at 12:49

## 2019-11-26 RX ADMIN — CEFAZOLIN 2 G: 1 INJECTION, POWDER, FOR SOLUTION INTRAVENOUS at 12:29

## 2019-11-26 RX ADMIN — FENTANYL CITRATE 50 MCG: 50 INJECTION, SOLUTION INTRAMUSCULAR; INTRAVENOUS at 12:35

## 2019-11-26 RX ADMIN — LIDOCAINE HYDROCHLORIDE 50 MG: 20 INJECTION, SOLUTION EPIDURAL; INFILTRATION; INTRACAUDAL; PERINEURAL at 12:28

## 2019-11-26 RX ADMIN — ACETAMINOPHEN 1000 MG: 500 TABLET, FILM COATED ORAL at 11:41

## 2019-11-26 RX ADMIN — ONDANSETRON 4 MG: 2 INJECTION INTRAMUSCULAR; INTRAVENOUS at 12:31

## 2019-11-26 RX ADMIN — PROPOFOL 200 MG: 10 INJECTION, EMULSION INTRAVENOUS at 12:28

## 2019-11-26 NOTE — DISCHARGE INSTRUCTIONS
ACTIVITY: Rest and take it easy for the first 24 hours.  A responsible adult is recommended to remain with you during that time.  It is normal to feel sleepy.  We encourage you to not do anything that requires balance, judgment or coordination.    MILD FLU-LIKE SYMPTOMS ARE NORMAL. YOU MAY EXPERIENCE GENERALIZED MUSCLE ACHES, THROAT IRRITATION, HEADACHE AND/OR SOME NAUSEA.    FOR 24 HOURS DO NOT:  Drive, operate machinery or run household appliances.  Drink beer or alcoholic beverages.   Make important decisions or sign legal documents.    SPECIAL INSTRUCTIONS: weight bearing as tolerated right lower extremity. Use crutches as needed.  Ice and elevate often.    DIET: To avoid nausea, slowly advance diet as tolerated, avoiding spicy or greasy foods for the first day.  Add more substantial food to your diet according to your physician's instructions.  Babies can be fed formula or breast milk as soon as they are hungry.  INCREASE FLUIDS AND FIBER TO AVOID CONSTIPATION.    SURGICAL DRESSING/BATHING: keep dressing clean dry and leave in place.  Follow Dr. Plascencia instructions sheet further.      FOLLOW-UP APPOINTMENT:  A follow-up appointment should be arranged with your doctor tomorrow; call to schedule.    You should CALL YOUR PHYSICIAN if you develop:  Fever greater than 101 degrees F.  Pain not relieved by medication, or persistent nausea or vomiting.  Excessive bleeding (blood soaking through dressing) or unexpected drainage from the wound.  Extreme redness or swelling around the incision site, drainage of pus or foul smelling drainage.  Inability to urinate or empty your bladder within 8 hours.  Problems with breathing or chest pain.    You should call 911 if you develop problems with breathing or chest pain.  If you are unable to contact your doctor or surgical center, you should go to the nearest emergency room or urgent care center.  Physician's telephone #: [phone]    If any questions arise, call your  doctor.  If your doctor is not available, please feel free to call the Surgical Center at {Surgical Dept Numbers:[id number]}.  The Center is open Monday through Friday from 7AM to 7PM.  You can also call the HEALTH HOTLINE open 24 hours/day, 7 days/week and speak to a nurse at [phone], or toll free at [phone].    A registered nurse may call you a few days after your surgery to see how you are doing after your procedure.    MEDICATIONS: Resume taking daily medication.  Take prescribed pain medication with food.  If no medication is prescribed, you may take non-aspirin pain medication if needed.  PAIN MEDICATION CAN BE VERY CONSTIPATING.  Take a stool softener or laxative such as senokot, pericolace, or milk of magnesia if needed.    Prescription given prior to surgery.  No pain medication were given in Recovery, may take medications as prescribed.    If your physician has prescribed pain medication that includes Acetaminophen (Tylenol), do not take additional Acetaminophen (Tylenol) while taking the prescribed medication.    Depression / Suicide Risk    As you are discharged from this [facility] facility, it is important to learn how to keep safe from harming yourself.    Recognize the warning signs:  · Abrupt changes in personality, positive or negative- including increase in energy   · Giving away possessions  · Change in eating patterns- significant weight changes-  positive or negative  · Change in sleeping patterns- unable to sleep or sleeping all the time   · Unwillingness or inability to communicate  · Depression  · Unusual sadness, discouragement and loneliness  · Talk of wanting to die  · Neglect of personal appearance   · Rebelliousness- reckless behavior  · Withdrawal from people/activities they love  · Confusion- inability to concentrate     If you or a loved one observes any of these behaviors or has concerns about self-harm, here's what you can do:  · Talk about it- your feelings and reasons  for harming yourself  · Remove any means that you might use to hurt yourself (examples: pills, rope, extension cords, firearm)  · Get professional help from the community (Mental Health, Substance Abuse, psychological counseling)  · Do not be alone:Call your Safe Contact- someone whom you trust who will be there for you.  · Call your local CRISIS HOTLINE 152-9020 or 839-478-0689  · Call your local Children's Mobile Crisis Response Team [place] (044) 391-4835 or www.[place] [employer]  · Call the toll free National Suicide Prevention Hotlines   · National Suicide Prevention Lifeline 386-119-TTIG (9148)  · National Hope Line Network 800-SUICIDE (516-1136)

## 2019-11-26 NOTE — ANESTHESIA PREPROCEDURE EVALUATION
Relevant Problems   ANESTHESIA   (+) JENNIFER (obstructive sleep apnea)      PULMONARY (within normal limits)      NEURO   (+) History of anemia      CARDIAC (within normal limits)      GI (within normal limits)       (within normal limits)      ENDO   (+) Hypothyroidism       Physical Exam    Airway   Mallampati: II  TM distance: >3 FB  Neck ROM: full       Cardiovascular - normal exam  Rhythm: regular  Rate: normal  (-) murmur     Dental - normal exam         Pulmonary - normal exam  Breath sounds clear to auscultation     Abdominal    Neurological - normal exam                 Anesthesia Plan    ASA 2       Plan - general       Airway plan will be LMA        Induction: intravenous    Postoperative Plan: Postoperative administration of opioids is intended.    Pertinent diagnostic labs and testing reviewed    Informed Consent:    Anesthetic plan and risks discussed with patient.    Use of blood products discussed with: patient whom consented to blood products.

## 2019-11-26 NOTE — ANESTHESIA TIME REPORT
Anesthesia Start and Stop Event Times     Date Time Event    11/26/2019 1214 Ready for Procedure     1221 Anesthesia Start     1306 Anesthesia Stop        Responsible Staff  11/26/19    Name Role Begin End    Austin Nguyen M.D. Anesth 1221 1306        Preop Diagnosis (Free Text):  Pre-op Diagnosis     TEAR LATERAL MENISCUS OF KNEE RIGHT        Preop Diagnosis (Codes):    Post op Diagnosis  Traumatic tear of lateral meniscus of knee, right, initial encounter      Premium Reason  Non-Premium    Comments:

## 2019-11-26 NOTE — OR NURSING
1044: Brought patient back to pre-op and assumed care.  1143: Patient allergies and NPO status verified, home medication reconciliation completed and belongings secured. Patient verbalizes understanding of pain scale, expected course of stay and plan of care. Surgical site verified with patient. IV access established. Sequentials placed on legs.

## 2019-11-26 NOTE — ANESTHESIA PROCEDURE NOTES
Airway  Date/Time: 11/26/2019 12:28 PM  Performed by: Austin Nguyen M.D.  Authorized by: Austin Nguyen M.D.     Location:  OR  Urgency:  Elective  Indications for Airway Management:  Anesthesia  Spontaneous Ventilation: absent    Sedation Level:  Deep  Preoxygenated: Yes    Final Airway Type:  Supraglottic airway  Final Supraglottic Airway:  Standard LMA  SGA Size:  4  Number of Attempts at Approach:  1

## 2019-11-26 NOTE — OP REPORT
DATE OF SERVICE:  11/26/2019    PREOPERATIVE DIAGNOSIS:  Right knee lateral meniscus tear and chondromalacia.    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    PROCEDURES:  Right knee arthroscopy with partial lateral meniscectomy and   chondroplasty of the medial femoral condyle, lateral femoral condyle.    SURGEON:  Lonny Gr MD    ASSISTANT:  Negin Robbins, certified first assist.    ANESTHESIA:  General and local.    ANESTHESIOLOGIST:  Austin Nguyen MD    TOURNIQUET USE:  None.    BLOOD LOSS:  Minimal.    COMPLICATIONS:  No apparent.    DISPOSITION:  PACU.    CONDITION:  Stable.    INDICATIONS:  The patient is a 69-year-old female with ongoing knee pain,   short-lived relief with injections, mechanical symptoms.  MRI showed a lateral   meniscus tear with chondromalacia of the lateral femoral condyle.  We   discussed risks, benefits, rationale, alternatives of surgery, risks included   but not limited to infection, neurovascular injury, incomplete relief of   symptoms, inability to treat osteoarthritis, progressive degenerative   symptoms, need for further surgery, DVT, PE, complications of anesthesia,   cardiac arrest.  Informed consent was signed and placed on the chart.  All of   her questions were answered; no guarantees implied or given.    TECHNIQUE:  Both the patient and I agreed the correct operative extremity.    The right leg was signed and marked in preoperative holding.  She was given 1   gram of IV Ancef prophylaxis and taken to the operative suite.  After adequate   anesthesia everyone in the room paused for a timeout to identify the correct   patient, limb, and procedure.  The right leg was sterilely prepped and draped   in standard fashion.  Tourniquet was placed, but not inflated.   Standard   arthroscopic portals were established.  Findings were as follows:  There was   grade III chondromalacia of the patella, which was stable.  There was   synovitis throughout the knee.  The medial compartment showed a flap of grade   III wear that was unstable to probing.  The medial meniscus had a small   leading edge fibrillation.  ACL and PCL were in good condition.  The lateral   compartment showed significant synovitis, grade III wear of the lateral   femoral condyle with unstable flaps and grade II diffuse wear of the lateral   tibial plateau.  There was a radial tear going into the popliteal hiatus of   the lateral meniscus with significant degeneration of the posterior horn, part   of which was flipped into the posterior compartment.  Shaver was used to   perform a chondroplasty on the medial femoral condyle to this on the unstable   edge and then shaver and biter were used to remove the posterior component of   the lateral meniscus, which was unstable.  Biter were used to contour the mid   portion anteriorly to save the anterior horn and mid body, which was attached   well to the capsule, but the majority of the posterior meniscus was removed.    Shaver was used to perform a chondroplasty on the lateral femoral condylar,   unstable cartilage.  The diffuse wear on the patella was stable and did not   require chondroplasty.  Scope was placed posteromedially and posterolaterally.    This was free of loose debris, but there was significant synovitis.    Instruments and fluid removed.  Portals were closed with simple nylon.  A 0.5%   Marcaine plain was injected.  Xeroform soft compressive dressing was applied.    Patient was transferred to recovery in stable condition.  Counts were   correct.  No apparent complications.    Negin Robbins, certified first assist, was essential for successful   completion of the case.  It could not have been without her.       ____________________________________      MD ASTER Thapa / MIGUEL    DD:  11/26/2019 13:21:14  DT:  11/26/2019 13:30:24    D#:  [id number]  Job#:  [id number]

## 2019-11-26 NOTE — OR NURSING
1305-Pt arrived Pacu.  Unresponsive to verbal or tactile stim.. LMA In place, clear spontaneous respirations throughout. O2 at 10Lmask  VSS.  Dsg CDI to R knee + palp pulses pedal and PT , Iced and elevated.    1315- spontaneous arousal, LMA d/cd pt tolerated well/ O2 weaned to 2LNC. vss pt denies pain or nausea. Tolerates po flds.  Polar care placed.  1345- pt awake and orientedx3 states r knee discomfort 5 and tolerable states does not require pain mds at pesent. Vss. O2 weaned to RA.   1405- pt c/o pain increasing. Bp noted to increase. Pt medicated per orders. Vss.   1430- pt states pain tolerable bp returning to WNLs.   1445- vss denies pain or nausea, meets criteria for transfer to stg 2. Report to corrie

## 2019-11-26 NOTE — OR SURGEON
Immediate Post OP Note    PreOp Diagnosis: right knee lateral mensicus tear, chondromalacia    PostOp Diagnosis: same     Procedure(s):  ARTHROSCOPY, KNEE - Wound Class: Clean  MENISCECTOMY, KNEE, MEDIAL - FOR PARTIAL LATERAL - Wound Class: Clean  CHONDROPLASTY - Wound Class: Clean    Surgeon(s):  Lonny Gr M.D.    Anesthesiologist/Type of Anesthesia:  Anesthesiologist: Austin Nguyen M.D./General    Surgical Staff:  Assistant: Negin Robbins CFA  Circulator: Tamika Huffman R.N.  Scrub Person: Dio De La Rosa    Specimens removed if any:  * No specimens in log *    Estimated Blood Loss: minimal    Findings: lateral meniscus tear, LFC and MFC chondromalacia     Complications: no apparent         11/26/2019 1:16 PM Lonny Gr M.D.

## 2019-12-04 ASSESSMENT — PAIN SCALES - GENERAL: PAIN_LEVEL: 3

## 2019-12-04 NOTE — ANESTHESIA POSTPROCEDURE EVALUATION
Patient: Sinai Hoover    Procedure Summary     Date:  11/26/19 Room / Location:   OR  / SURGERY [facility]    Anesthesia Start:  1221 Anesthesia Stop:  1306    Procedures:       ARTHROSCOPY, KNEE (Right Knee)      MENISCECTOMY, KNEE, MEDIAL - FOR PARTIAL LATERAL (Right Knee)      CHONDROPLASTY (Right Knee) Diagnosis:  (TEAR LATERAL MENISCUS OF KNEE RIGHT)    Surgeon:  Lonny Gr M.D. Responsible Provider:  Austin Nguyen M.D.    Anesthesia Type:  general ASA Status:  2          Final Anesthesia Type: general  Last vitals  /78       Temp 36.3       Pulse 89      Resp 16       SpO2 98         Anesthesia Post Evaluation    Patient location during evaluation: PACU  Patient participation: complete - patient participated  Level of consciousness: awake and alert  Pain score: 3    Airway patency: patent  Anesthetic complications: no  Cardiovascular status: hemodynamically stable  Respiratory status: acceptable  Hydration status: euvolemic    PONV: none           Nurse Pain Score: 3 (NPRS)

## 2020-01-21 ENCOUNTER — APPOINTMENT (RX ONLY)
Dept: URBAN - NONMETROPOLITAN AREA CLINIC 1 | Facility: CLINIC | Age: 70
Setting detail: DERMATOLOGY
End: 2020-01-21

## 2020-01-21 DIAGNOSIS — L21.8 OTHER SEBORRHEIC DERMATITIS: ICD-10-CM

## 2020-01-21 DIAGNOSIS — Z85.828 PERSONAL HISTORY OF OTHER MALIGNANT NEOPLASM OF SKIN: ICD-10-CM

## 2020-01-21 DIAGNOSIS — M72.0 PALMAR FASCIAL FIBROMATOSIS [DUPUYTREN]: ICD-10-CM

## 2020-01-21 DIAGNOSIS — L81.4 OTHER MELANIN HYPERPIGMENTATION: ICD-10-CM

## 2020-01-21 DIAGNOSIS — D18.0 HEMANGIOMA: ICD-10-CM

## 2020-01-21 PROBLEM — D18.01 HEMANGIOMA OF SKIN AND SUBCUTANEOUS TISSUE: Status: ACTIVE | Noted: 2020-01-21

## 2020-01-21 PROBLEM — E03.9 HYPOTHYROIDISM, UNSPECIFIED: Status: ACTIVE | Noted: 2020-01-21

## 2020-01-21 PROCEDURE — ? COUNSELING

## 2020-01-21 PROCEDURE — ? VITAMIN B3 COUNSELING

## 2020-01-21 PROCEDURE — 99203 OFFICE O/P NEW LOW 30 MIN: CPT

## 2020-01-21 ASSESSMENT — LOCATION SIMPLE DESCRIPTION DERM
LOCATION SIMPLE: SCALP
LOCATION SIMPLE: LEFT CHEEK
LOCATION SIMPLE: LEFT HAND
LOCATION SIMPLE: CHEST
LOCATION SIMPLE: LEFT UPPER BACK

## 2020-01-21 ASSESSMENT — LOCATION DETAILED DESCRIPTION DERM
LOCATION DETAILED: RIGHT MEDIAL SUPERIOR CHEST
LOCATION DETAILED: LEFT MEDIAL UPPER BACK
LOCATION DETAILED: LEFT SUPERIOR MEDIAL BUCCAL CHEEK
LOCATION DETAILED: LEFT MEDIAL SUPERIOR CHEST
LOCATION DETAILED: LEFT ULNAR PALM
LOCATION DETAILED: RIGHT SUPERIOR PARIETAL SCALP

## 2020-01-21 ASSESSMENT — LOCATION ZONE DERM
LOCATION ZONE: FACE
LOCATION ZONE: SCALP
LOCATION ZONE: TRUNK
LOCATION ZONE: HAND

## 2020-01-21 NOTE — PROCEDURE: VITAMIN B3 COUNSELING
Counseling Text: I recommended taking nicotinamide or niacinamide, also know as vitamin B3, twice daily. Recent evidence suggests that taking vitamin B3 (500 mg twice daily) can reduce the risk of actinic keratoses and non-melanoma skin cancers. Side effects of vitamin B3 include flushing and headache. Please check with your primary care provider to see if there any reasons not to take this supplement.
Detail Level: Generalized

## 2020-05-15 NOTE — TELEPHONE ENCOUNTER
Was the patient seen in the last year in this department? Yes   Does patient have an active prescription for medications requested? No   Received Request Via: Pharmacy

## 2020-05-19 RX ORDER — LEVOTHYROXINE SODIUM 88 UG/1
TABLET ORAL
Qty: 100 TAB | Refills: 3 | Status: SHIPPED | OUTPATIENT
Start: 2020-05-19 | End: 2021-06-03 | Stop reason: SDUPTHER

## 2020-07-22 ENCOUNTER — OFFICE VISIT (OUTPATIENT)
Dept: MEDICAL GROUP | Facility: MEDICAL CENTER | Age: 70
End: 2020-07-22
Payer: MEDICARE

## 2020-07-22 VITALS
BODY MASS INDEX: 20.61 KG/M2 | HEART RATE: 60 BPM | WEIGHT: 136 LBS | OXYGEN SATURATION: 95 % | HEIGHT: 68 IN | RESPIRATION RATE: 16 BRPM | SYSTOLIC BLOOD PRESSURE: 132 MMHG | DIASTOLIC BLOOD PRESSURE: 74 MMHG | TEMPERATURE: 97 F

## 2020-07-22 DIAGNOSIS — H61.22 IMPACTED CERUMEN OF LEFT EAR: ICD-10-CM

## 2020-07-22 DIAGNOSIS — R03.0 ELEVATED BLOOD PRESSURE READING IN OFFICE WITHOUT DIAGNOSIS OF HYPERTENSION: ICD-10-CM

## 2020-07-22 DIAGNOSIS — R42 VERTIGO: ICD-10-CM

## 2020-07-22 DIAGNOSIS — R42 DIZZINESS: Primary | ICD-10-CM

## 2020-07-22 DIAGNOSIS — H91.92 HEARING LOSS OF LEFT EAR, UNSPECIFIED HEARING LOSS TYPE: ICD-10-CM

## 2020-07-22 DIAGNOSIS — H92.02 LEFT EAR PAIN: ICD-10-CM

## 2020-07-22 PROCEDURE — 99214 OFFICE O/P EST MOD 30 MIN: CPT | Performed by: FAMILY MEDICINE

## 2020-07-22 RX ORDER — ASPIRIN 81 MG
6 TABLET, DELAYED RELEASE (ENTERIC COATED) ORAL 2 TIMES DAILY
Qty: 8 ML | Refills: 11 | Status: SHIPPED | OUTPATIENT
Start: 2020-07-22 | End: 2020-08-01

## 2020-07-22 ASSESSMENT — PATIENT HEALTH QUESTIONNAIRE - PHQ9: CLINICAL INTERPRETATION OF PHQ2 SCORE: 0

## 2020-07-22 NOTE — PROGRESS NOTES
This medical record contains text that has been entered with the assistance of computer voice recognition and dictation software.  Therefore, it may contain unintended errors in text, spelling, punctuation, or grammar        Chief Complaint   Patient presents with   • Otalgia     2 wks ago had left ear ache, feeling vertigo       Sinai Hoover is a 70 y.o. female here evaluation and management of:   Sudden onset episode of L ear pain 2 weeks ago while bike riding   This has persisted and become more mild   She also noted hearing loss during this time   She also noted a couple episodes of vertigo, true vertigo, on and off transient during this time     Current Outpatient Medications   Medication Sig Dispense Refill   • carbamide peroxide (DEBROX) 6.5 % Solution Place 6 Drops in ear 2 times a day for 10 days. 8 mL 11   • levothyroxine (SYNTHROID) 88 MCG Tab TAKE 1 TABLET BY MOUTH IN THE MORNING ON AN EMPTY STOMACH 100 Tab 3     No current facility-administered medications for this visit.      Patient Active Problem List    Diagnosis Date Noted   • Dizziness 07/22/2020   • Hearing loss of left ear 07/22/2020   • Left ear pain 07/22/2020   • Elevated blood pressure reading in office without diagnosis of hypertension 07/22/2020   • Preop examination 11/13/2019   • HSV infection 09/12/2019   • History of anemia 09/12/2019   • Wart on thumb 09/12/2019   • JENNIFER (obstructive sleep apnea) 02/14/2018   • Hypothyroidism      Past Surgical History:   Procedure Laterality Date   • KNEE ARTHROSCOPY Right 11/26/2019    Procedure: ARTHROSCOPY, KNEE;  Surgeon: Lonny Gr M.D.;  Location: [facility];  Service: Orthopedics   • MEDIAL MENISCECTOMY Right 11/26/2019    Procedure: MENISCECTOMY, KNEE, MEDIAL - FOR PARTIAL LATERAL;  Surgeon: Lonny Gr M.D.;  Location: SURGERY [facility];  Service: Orthopedics   • CHONDROPLASTY Right 11/26/2019    Procedure: CHONDROPLASTY;  Surgeon: Lonny MCLAIN  "ROCCO Gr;  Location: SURGERY [facility];  Service: Orthopedics   • HIP REPLACEMENT, TOTAL Left 2016   • MENISCECTOMY Left 1994   • DILATION AND CURETTAGE  1992   • COLONOSCOPY  2001, 2009    recheck age 64y   • US-NEEDLE CORE BX-BREAST PANEL    2004 ?     left breast, benign      Social History     Tobacco Use   • Smoking status: Never Smoker   • Smokeless tobacco: Never Used   Substance Use Topics   • Alcohol use: Yes     Comment: 5 per month   • Drug use: No     Family History   Problem Relation Age of Onset   • Alcohol/Drug Mother    • Heart Disease Father    • Alcohol/Drug Brother            ROS    all review of system completed and negative except for those listed above     Objective:     /74 (BP Location: Left arm, Patient Position: Sitting, BP Cuff Size: Adult)   Pulse 60   Temp 36.1 °C (97 °F) (Temporal)   Resp 16   Ht 1.727 m (5' 8\")   Wt 61.7 kg (136 lb)   SpO2 95%  Body mass index is 20.68 kg/m².  Physical Exam:        GEN: comfortable, alert and oriented, well nourished, well developed, in no apparent distress   HEENT: NCAT, eyes: pupils equal and reactive, sclera white, EOMIT, good dentition    EARS : L ear canal is obstructed by soft wax   HEART: limbs warm and well perfused, regular rate, no JVD, no lower extremity edema  LUNGS: speaking in full sentences, not in apparent respiratory distress, no audible wheezes  MSK: normal tone and bulk, no swelling of the joints, gait steady and normal           Assessment and Plan:   The following treatment plan was discussed        Problem List Items Addressed This Visit     Dizziness - Primary    Relevant Orders    Basic Metabolic Panel    MICROALBUMIN CREAT RATIO URINE    TSH    REFERRAL TO ENT    Hearing loss of left ear    Left ear pain     She does have excessive cerumen and it is soft   I doubt that this is causing her symptoms in total , could potentially cause hearing loss and possible pain (doubtful as wax appears soft) but would " not explain vertigo which per her description is true vertigo   I suggest trial of debrox at home   Ref to ENT if symptoms do not improve shortly    Over 25 minutes spent with patient face to face, greater than 50% time spent with plan/coordination of care regarding that which is discussed in the HPI and A&P             Relevant Orders    REFERRAL TO ENT    Elevated blood pressure reading in office without diagnosis of hypertension     Will monitor only     Labs ordered for her to do annually            Relevant Orders    Basic Metabolic Panel    MICROALBUMIN CREAT RATIO URINE    Lipid Profile    TSH      Other Visit Diagnoses     Impacted cerumen of left ear        Relevant Medications    carbamide peroxide (DEBROX) 6.5 % Solution                Instructed to follow up if symptoms worsen or fail to improve, ER/UC precautions discussed as well    Nilda Pinto MD  [facility], Family Medicine   [address] [place] [address]   [place]AC[place] [id number]  Phone: [phone]

## 2020-07-22 NOTE — ASSESSMENT & PLAN NOTE
She does have excessive cerumen and it is soft   I doubt that this is causing her symptoms in total , could potentially cause hearing loss and possible pain (doubtful as wax appears soft) but would not explain vertigo which per her description is true vertigo   I suggest trial of debrox at home   Ref to ENT if symptoms do not improve shortly    Over 25 minutes spent with patient face to face, greater than 50% time spent with plan/coordination of care regarding that which is discussed in the HPI and A&P

## 2020-11-13 ENCOUNTER — OFFICE VISIT (OUTPATIENT)
Dept: SLEEP MEDICINE | Facility: MEDICAL CENTER | Age: 70
End: 2020-11-13
Payer: MEDICARE

## 2020-11-13 VITALS
SYSTOLIC BLOOD PRESSURE: 124 MMHG | OXYGEN SATURATION: 92 % | BODY MASS INDEX: 21.22 KG/M2 | WEIGHT: 140 LBS | DIASTOLIC BLOOD PRESSURE: 70 MMHG | HEIGHT: 68 IN | HEART RATE: 70 BPM

## 2020-11-13 DIAGNOSIS — G47.33 OSA (OBSTRUCTIVE SLEEP APNEA): ICD-10-CM

## 2020-11-13 PROCEDURE — 99213 OFFICE O/P EST LOW 20 MIN: CPT | Performed by: NURSE PRACTITIONER

## 2020-11-13 NOTE — PROGRESS NOTES
"Chief Complaint   Patient presents with   • Follow-Up     JENNIFER-Last seen 07/19/2018       HPI:      Mrs. Hoover is a 71 y/o female patient who is in today for annual JENNIFER f/u, overdue. PMH includes hypothyroidism, HSV, dizziness, hearing loss of left ear, non smoker.     Per Vero Olmedo APRN note from 7/19/18: \"CT scan of the chest PE protocol on June 2, 2017. This showed no evidence of pulmonary emboli but did show evidence of some hyperexpanded and emphysematous lungs and a 4.1 mm nodule in the right upper lobe and a 1.3 mm nodule in the right lower lobe. Reviewed CT scan of the chest with Dr. Marilyn Garcia. Tiny long nodules, stable x 1 year duration. Non smoker. No further follow up required.\"     Polysomnogram from 12/8/2017 indicated evidence of moderate sleep apnea with an AHI of 15.8, REM index of 19.5 with a low 02 saturation of 82%. She had fragmentation of sleep with very prolonged sleep onset latency, increased wake after sleep onset time, and a marked increase in the arousal and awakening index.     OPO (date??) which showed a low oxygen saturation of 78% from a baseline oxygen saturation of 95% and the oxygen desaturation index was 9.2 per the computer algorithm.    Compliance report from 10/14/20-11/12/20 was downloaded and reviewed with the patient which showed autoCPAP 5-15 cmH2O, 100% compliance, 8 hrs 42 min use, AHI of 1.5. She is tolerating the pressure and mask well. She goes to bed between 9-9:30 pm and wakes up at 6 am.  She sleeps much better and is not as tired when she is using the CPAP.  Her brain is also not as foggy when she is using the CPAP.  She denies morning headache or snoring.  She does still nap 3 times a week for 30 to 45 minutes after a workout.  She also purchased a travel machine because she never goes a night without using the CPAP.  She stays fairly active and works out 6 days a week anywhere between an hour to 3 hours.  She goes to the gym and performs Pilates, weight " strengthening and swimming etc. she continues to work as an  which keeps her busy and on her feet all day.  She denies any new health problems or medications.      ROS:    Constitutional: Denies fevers, Denies weight changes  Eyes: Denies changes in vision, no eye pain  Ears/Nose/Throat/Mouth: Denies nasal congestion or sore throat   Cardiovascular: Denies chest pain or palpitations   Respiratory: Denies shortness of breath , Denies cough  Gastrointestinal/Hepatic: Denies abdominal pain, nausea, vomiting,   Skin/Breast: Denies rash,   Neurological: Denies headache, confusion,   Psychiatric: denies mood disorder   Sleep: denies snoring       Past Medical History:   Diagnosis Date   • Hypothyroidism    • Sleep apnea 11/2019    Autopap       Past Surgical History:   Procedure Laterality Date   • KNEE ARTHROSCOPY Right 11/26/2019    Procedure: ARTHROSCOPY, KNEE;  Surgeon: Lonny Gr M.D.;  Location: [facility];  Service: Orthopedics   • MEDIAL MENISCECTOMY Right 11/26/2019    Procedure: MENISCECTOMY, KNEE, MEDIAL - FOR PARTIAL LATERAL;  Surgeon: Lonny Gr M.D.;  Location: [facility];  Service: Orthopedics   • CHONDROPLASTY Right 11/26/2019    Procedure: CHONDROPLASTY;  Surgeon: Lonny Gr M.D.;  Location: [facility];  Service: Orthopedics   • HIP REPLACEMENT, TOTAL Left 2016   • MENISCECTOMY Left 1994   • DILATION AND CURETTAGE  1992   • COLONOSCOPY  2001, 2009    recheck age 64y   • US-NEEDLE CORE BX-BREAST PANEL    2004 ?     left breast, benign       Family History   Problem Relation Age of Onset   • Alcohol/Drug Mother    • Heart Disease Father    • Alcohol/Drug Brother        Social History     Socioeconomic History   • Marital status:      Spouse name: Not on file   • Number of children: Not on file   • Years of education: Not on file   • Highest education level: Not on file   Occupational History   • Not on file    Social Needs   • Financial resource strain: Not on file   • Food insecurity     Worry: Not on file     Inability: Not on file   • Transportation needs     Medical: Not on file     Non-medical: Not on file   Tobacco Use   • Smoking status: Never Smoker   • Smokeless tobacco: Never Used   Substance and Sexual Activity   • Alcohol use: Yes     Comment: 5 per month   • Drug use: No   • Sexual activity: Yes     Partners: Male     Comment: , interior design, 3 daughters   Lifestyle   • Physical activity     Days per week: Not on file     Minutes per session: Not on file   • Stress: Not on file   Relationships   • Social connections     Talks on phone: Not on file     Gets together: Not on file     Attends [employer] service: Not on file     Active member of club or organization: Not on file     Attends meetings of clubs or organizations: Not on file     Relationship status: Not on file   • Intimate partner violence     Fear of current or ex partner: Not on file     Emotionally abused: Not on file     Physically abused: Not on file     Forced sexual activity: Not on file   Other Topics Concern   • Not on file   Social History Narrative   • Not on file       Allergies as of 11/13/2020 - Reviewed 11/13/2020   Allergen Reaction Noted   • Celebrex [celecoxib] Nausea 04/19/2017        Vitals:  Vitals:    11/13/20 1514   BP: 124/70   Pulse: 70   SpO2: 92%       Current medications as of today   Current Outpatient Medications   Medication Sig Dispense Refill   • levothyroxine (SYNTHROID) 88 MCG Tab TAKE 1 TABLET BY MOUTH IN THE MORNING ON AN EMPTY STOMACH 100 Tab 3     No current facility-administered medications for this visit.          Physical Exam:   Appearance: Well developed, well nourished, no acute distress  Eyes: PERRL, EOM intact, sclera white, conjunctiva moist  Ears: no lesions or deformities  Hearing: grossly intact  Nose: no lesions or deformities  Respiratory effort: no intercostal retractions or use of  accessory muscles  Extremities: no cyanosis or edema  Abdomen: soft ,non tender, no masses  Gait and Station: normal  Digits and nails: no clubbing, cyanosis, petechiae or nodes.  Cranial nerves: grossly intact  Skin: no visible rashes, lesions or ulcers noted  Orientation: Oriented to time, person and place  Mood and affect: mood and affect appropriate, normal interaction with examiner  Judgement: Intact    Assessment:  1. JENNIFER (obstructive sleep apnea)  DME Mask and Supplies   2. BMI 21.0-21.9, adult           Plan  Discussed the cardiovascular and neuropsychiatric risks of untreated JENNIFER; including but not limited to: HTN, DM, MI, ASCVD, CVA, CHF, traffic accidents.     1. Compliance report from 10/14/20-11/12/20 was downloaded and reviewed with the patient which showed autoCPAP 5-15 cmH2O, 100% compliance, 8 hrs 42 min use, AHI of 1.5. Continue autoCPAP. Patient is compliant and benefiting from autoCPAP therapy for management of JENNIFER.   2. DME order ([facility]) for mask (dreamwear nasal mask or MOC) and supplies was provided today. Continue to clean mask and supplies weekly with soap and water, and change supplies per insurance guidelines.   3. Continue to stay active by performing Pilates, weight training, swimming and walking 6 days a week.   4. Follow up with the appropriate healthcare practitioners for all other medical problems and issues.  5. Sleep hygiene discussed.    6. F/u in 1 year, sooner if needed.       MERCEDES Bergeron.      This dictation was created using voice recognition software. The accuracy of the dictation is limited to the abilities of the software. I expect there may be some errors of grammar and possibly content.

## 2020-12-18 ENCOUNTER — HOSPITAL ENCOUNTER (OUTPATIENT)
Dept: RADIOLOGY | Facility: MEDICAL CENTER | Age: 70
End: 2020-12-18
Attending: FAMILY MEDICINE
Payer: MEDICARE

## 2020-12-18 DIAGNOSIS — Z12.31 VISIT FOR SCREENING MAMMOGRAM: ICD-10-CM

## 2020-12-18 PROCEDURE — 77067 SCR MAMMO BI INCL CAD: CPT

## 2021-01-29 DIAGNOSIS — Z23 NEED FOR VACCINATION: ICD-10-CM

## 2021-06-03 NOTE — TELEPHONE ENCOUNTER
Received request via: Pharmacy    Was the patient seen in the last year in this department? Yes    Does the patient have an active prescription (recently filled or refills available) for medication(s) requested? No     Future Appointments       Provider Department Center    11/4/2021 10:10 AM MICK Bergeron [facility] Pulmonary Medicine

## 2021-06-08 RX ORDER — LEVOTHYROXINE SODIUM 88 UG/1
TABLET ORAL
Qty: 100 TABLET | Refills: 0 | Status: SHIPPED | OUTPATIENT
Start: 2021-06-08 | End: 2022-02-08 | Stop reason: SDUPTHER

## 2021-08-03 ENCOUNTER — PATIENT MESSAGE (OUTPATIENT)
Dept: HEALTH INFORMATION MANAGEMENT | Facility: OTHER | Age: 71
End: 2021-08-03

## 2021-11-18 ENCOUNTER — OFFICE VISIT (OUTPATIENT)
Dept: MEDICAL GROUP | Facility: MEDICAL CENTER | Age: 71
End: 2021-11-18
Payer: MEDICARE

## 2021-11-18 VITALS
OXYGEN SATURATION: 95 % | WEIGHT: 137 LBS | RESPIRATION RATE: 16 BRPM | DIASTOLIC BLOOD PRESSURE: 76 MMHG | HEIGHT: 68 IN | TEMPERATURE: 97 F | BODY MASS INDEX: 20.76 KG/M2 | HEART RATE: 70 BPM | SYSTOLIC BLOOD PRESSURE: 128 MMHG

## 2021-11-18 DIAGNOSIS — Z12.31 ENCOUNTER FOR SCREENING MAMMOGRAM FOR BREAST CANCER: ICD-10-CM

## 2021-11-18 DIAGNOSIS — Z00.00 MEDICARE ANNUAL WELLNESS VISIT, SUBSEQUENT: ICD-10-CM

## 2021-11-18 DIAGNOSIS — G47.30 SLEEP APNEA, UNSPECIFIED TYPE: ICD-10-CM

## 2021-11-18 DIAGNOSIS — Z12.11 SCREENING FOR COLON CANCER: ICD-10-CM

## 2021-11-18 DIAGNOSIS — Z11.59 NEED FOR HEPATITIS C SCREENING TEST: ICD-10-CM

## 2021-11-18 DIAGNOSIS — G47.33 OSA (OBSTRUCTIVE SLEEP APNEA): ICD-10-CM

## 2021-11-18 DIAGNOSIS — Z13.6 SCREENING FOR ISCHEMIC HEART DISEASE: ICD-10-CM

## 2021-11-18 DIAGNOSIS — Z13.1 SCREENING FOR DIABETES MELLITUS (DM): ICD-10-CM

## 2021-11-18 DIAGNOSIS — R03.0 ELEVATED BLOOD PRESSURE READING IN OFFICE WITHOUT DIAGNOSIS OF HYPERTENSION: ICD-10-CM

## 2021-11-18 DIAGNOSIS — E03.9 HYPOTHYROIDISM, UNSPECIFIED TYPE: ICD-10-CM

## 2021-11-18 PROCEDURE — G0439 PPPS, SUBSEQ VISIT: HCPCS | Performed by: FAMILY MEDICINE

## 2021-11-18 ASSESSMENT — ACTIVITIES OF DAILY LIVING (ADL): BATHING_REQUIRES_ASSISTANCE: 0

## 2021-11-18 ASSESSMENT — PATIENT HEALTH QUESTIONNAIRE - PHQ9: CLINICAL INTERPRETATION OF PHQ2 SCORE: 0

## 2021-11-18 ASSESSMENT — ENCOUNTER SYMPTOMS: GENERAL WELL-BEING: EXCELLENT

## 2021-11-18 NOTE — ASSESSMENT & PLAN NOTE
Age appropriate prev health care discussed   Cancer screening discussed   Vaccines discussed   Labs offered   Blood pressure at goal     Anticipatory guidance including SPF and other skin protective measures, dental hygiene and care, regular exercise, diet, stress and family planning advice discussed.

## 2021-11-18 NOTE — PROGRESS NOTES
Chief Complaint   Patient presents with   • Annual Wellness Visit     pt uses CPAP machine which was not recalled. now recentyl she has been noticing bloating, is it her CPAP machine?         HPI:  Sinai is a 71 y.o. here for Medicare Annual Wellness Visit        Patient Active Problem List    Diagnosis Date Noted   • Dizziness 07/22/2020   • Hearing loss of left ear 07/22/2020   • Left ear pain 07/22/2020   • Elevated blood pressure reading in office without diagnosis of hypertension 07/22/2020   • Preop examination 11/13/2019   • HSV infection 09/12/2019   • History of anemia 09/12/2019   • Wart on thumb 09/12/2019   • JENNIFER (obstructive sleep apnea) 02/14/2018   • Hypothyroidism        Current Outpatient Medications   Medication Sig Dispense Refill   • levothyroxine (SYNTHROID) 88 MCG Tab TAKE 1 TABLET BY MOUTH IN THE MORNING ON AN EMPTY STOMACH 100 tablet 0     No current facility-administered medications for this visit.        Patient is taking medications as noted in medication list.  Current supplements as per medication list.     Allergies: Celebrex [celecoxib]    Current social contact/activities: yes     Is patient current with immunizations? No, due for FLU. Patient is interested in receiving NONE today.    She  reports that she has never smoked. She has never used smokeless tobacco. She reports current alcohol use. She reports that she does not use drugs.  Counseling given: Not Answered        DPA/Advanced directive: Patient has Advanced Directive on file.     ROS:    Gait: Uses no assistive device   Ostomy: No   Other tubes: No   Amputations: No   Chronic oxygen use No   Last eye exam 9/2021   Wears hearing aids: No   : Denies any urinary leakage during the last 6 months          Depression Screening    Little interest or pleasure in doing things?  0 - not at all  Feeling down, depressed, or hopeless? 0 - not at all  Patient Health Questionnaire Score: 0    If depressive symptoms identified deferred to  follow up visit unless specifically addressed in assessment and plan.    Interpretation of PHQ-9 Total Score   Score Severity   1-4 No Depression   5-9 Mild Depression   10-14 Moderate Depression   15-19 Moderately Severe Depression   20-27 Severe Depression    Screening for Cognitive Impairment    Three Minute Recall (captain, garden, picture)  3/3    Wolf clock face with all 12 numbers and set the hands to show 5 past 8.  Yes    If cognitive concerns identified, deferred for follow up unless specifically addressed in assessment and plan.    Fall Risk Assessment    Has the patient had two or more falls in the last year or any fall with injury in the last year?  No  If fall risk identified, deferred for follow up unless specifically addressed in assessment and plan.    Safety Assessment    Throw rugs on floor.  No  Handrails on all stairs.  Yes  Good lighting in all hallways.  Yes  Difficulty hearing.  No  Patient counseled about all safety risks that were identified.    Functional Assessment ADLs    Are there any barriers preventing you from cooking for yourself or meeting nutritional needs?  No.    Are there any barriers preventing you from driving safely or obtaining transportation?  No.    Are there any barriers preventing you from using a telephone or calling for help?  No.    Are there any barriers preventing you from shopping?  No.    Are there any barriers preventing you from taking care of your own finances?  No.    Are there any barriers preventing you from managing your medications?  No.    Are there any barriers preventing you from showering, bathing or dressing yourself?  No.    Are you currently engaging in any exercise or physical activity?  Yes.  Exercising regularly  What is your perception of your health?  Excellent.    Health Maintenance Summary          Overdue - Annual Wellness Visit (Once) Overdue - never done    No completion history exists for this topic.          Overdue - HEPATITIS C  SCREENING (Once) Overdue - never done    No completion history exists for this topic.          Overdue - COVID-19 Vaccine (1) Overdue - never done    No completion history exists for this topic.          Overdue - IMM DTaP/Tdap/Td Vaccine (1 - Tdap) Overdue since 4/6/1993 04/05/1993  Imm Admin: TD Vaccine          Overdue - PAP SMEAR (Every 3 Years) Overdue since 7/1/2016 07/01/2013  PAP IG, RFX HPV ASCU          Overdue - IMM INFLUENZA (1) Overdue - never done    No completion history exists for this topic.          MAMMOGRAM (Yearly) Due soon on 12/18/2021 12/18/2020  MA-SCREENING MAMMO BILAT W/TOMOSYNTHESIS W/CAD    11/15/2018  Done    12/01/2014  MA-SCREENING MAMMOGRAM W/ CAD    10/29/2013  MA-SCREENING MAMMOGRAM W/ CAD    09/19/2012  MA-SCREENING MAMMOGRAM W/ CAD    Only the first 5 history entries have been loaded, but more history exists.          BONE DENSITY (Every 5 Years) Next due on 11/15/2023    11/15/2018  Done    07/22/2004  DS-BONE DENSITY STUDY (DEXA)          COLORECTAL CANCER SCREENING (COLONOSCOPY - Every 10 Years) Next due on 6/15/2025    06/15/2015  COLONOSCOPY (Done)    01/01/2009  COLONOSCOPY (Reason not specified - repeat in 5 yr, hx polyps)          IMM HEP B VACCINE (Series Information) Aged Out    10/25/2010  Imm Admin: Hep A/HEP B Combined Vaccine (TwinRix)    05/03/2010  Imm Admin: Hep A/HEP B Combined Vaccine (TwinRix)    03/22/2010  Imm Admin: Hep A/HEP B Combined Vaccine (TwinRix)          IMM ZOSTER VACCINES (Series Information) Completed    04/22/2019  Imm Admin: Zoster Vaccine Recombinant (RZV) (SHINGRIX)    01/15/2019  Imm Admin: Zoster Vaccine Recombinant (RZV) (SHINGRIX)    03/28/2011  Imm Admin: Zoster Vaccine Live (ZVL) (Zostavax) - HISTORICAL DATA          IMM PNEUMOCOCCAL VACCINE: 65+ Years (Series Information) Completed    11/13/2019  Imm Admin: Pneumococcal Conjugate Vaccine (Prevnar/PCV-13)    11/06/2018  Imm Admin: Pneumococcal polysaccharide vaccine (PPSV-23)  "         IMM MENINGOCOCCAL VACCINE (MCV4) (Series Information) Aged Out    No completion history exists for this topic.                Patient Care Team:  Nilda Pinto M.D. as PCP - General (Family Medicine)  Oxy Tech as Respiratory Therapist    Social History     Tobacco Use   • Smoking status: Never Smoker   • Smokeless tobacco: Never Used   Vaping Use   • Vaping Use: Never used   Substance Use Topics   • Alcohol use: Yes     Comment: 5 per month   • Drug use: No     Family History   Problem Relation Age of Onset   • Alcohol/Drug Mother    • Heart Disease Father    • Alcohol/Drug Brother      She  has a past medical history of Hypothyroidism and Sleep apnea (11/2019).   Past Surgical History:   Procedure Laterality Date   • KNEE ARTHROSCOPY Right 11/26/2019    Procedure: ARTHROSCOPY, KNEE;  Surgeon: Lonny Gr M.D.;  Location: [facility];  Service: Orthopedics   • MEDIAL MENISCECTOMY Right 11/26/2019    Procedure: MENISCECTOMY, KNEE, MEDIAL - FOR PARTIAL LATERAL;  Surgeon: Lonny Gr M.D.;  Location: [facility];  Service: Orthopedics   • CHONDROPLASTY Right 11/26/2019    Procedure: CHONDROPLASTY;  Surgeon: Lonny Gr M.D.;  Location: [facility];  Service: Orthopedics   • HIP REPLACEMENT, TOTAL Left 2016   • MENISCECTOMY Left 1994   • DILATION AND CURETTAGE  1992   • COLONOSCOPY  2001, 2009    recheck age 64y   • US-NEEDLE CORE BX-BREAST PANEL    2004 ?     left breast, benign           Exam:     /70 (BP Location: Left arm, Patient Position: Sitting, BP Cuff Size: Adult)   Pulse 70   Temp 36.1 °C (97 °F) (Temporal)   Resp 16   Ht 1.727 m (5' 8\")   Wt 62.1 kg (137 lb)   SpO2 95%  Body mass index is 20.83 kg/m².    Hearing excellent.    Dentition good  Alert, oriented in no acute distress.  Eye contact is good, speech goal directed, affect calm      Assessment and Plan. The following treatment and monitoring plan is " recommended:    No diagnosis found.    Problem List Items Addressed This Visit     Hypothyroidism     Will check              Relevant Orders    Basic Metabolic Panel    Lipid Profile    TSH    JENNIFER (obstructive sleep apnea)    Relevant Orders    Basic Metabolic Panel    Lipid Profile    TSH    Elevated blood pressure reading in office without diagnosis of hypertension     Recheck wnl            Medicare annual wellness visit, subsequent     Age appropriate prev health care discussed   Cancer screening discussed   Vaccines discussed   Labs offered   Blood pressure at goal     Anticipatory guidance including SPF and other skin protective measures, dental hygiene and care, regular exercise, diet, stress and family planning advice discussed.               Other Visit Diagnoses     Sleep apnea, unspecified type        Relevant Orders    Referral to Pulmonary and Sleep Medicine    Basic Metabolic Panel    Lipid Profile    TSH    Screening for diabetes mellitus (DM)        Relevant Orders    Basic Metabolic Panel    Lipid Profile    TSH    Screening for ischemic heart disease        Relevant Orders    Basic Metabolic Panel    Lipid Profile    TSH    Need for hepatitis C screening test        Relevant Orders    HEP C VIRUS ANTIBODY    Screening for colon cancer        Encounter for screening mammogram for breast cancer        Relevant Orders    MA-SCREENING MAMMO BILAT W/CAD        Problem List Items Addressed This Visit     Hypothyroidism     Will check              Relevant Orders    Basic Metabolic Panel    Lipid Profile    TSH    JENNIFER (obstructive sleep apnea)    Relevant Orders    Basic Metabolic Panel    Lipid Profile    TSH    Elevated blood pressure reading in office without diagnosis of hypertension     Recheck wnl            Medicare annual wellness visit, subsequent     Age appropriate prev health care discussed   Cancer screening discussed   Vaccines discussed   Labs offered   Blood pressure at goal      Anticipatory guidance including SPF and other skin protective measures, dental hygiene and care, regular exercise, diet, stress and family planning advice discussed.               Other Visit Diagnoses     Sleep apnea, unspecified type        Relevant Orders    Referral to Pulmonary and Sleep Medicine    Basic Metabolic Panel    Lipid Profile    TSH    Screening for diabetes mellitus (DM)        Relevant Orders    Basic Metabolic Panel    Lipid Profile    TSH    Screening for ischemic heart disease        Relevant Orders    Basic Metabolic Panel    Lipid Profile    TSH    Need for hepatitis C screening test        Relevant Orders    HEP C VIRUS ANTIBODY    Screening for colon cancer        Encounter for screening mammogram for breast cancer        Relevant Orders    MA-SCREENING MAMMO BILAT W/CAD            Services suggested: No services needed at this time  Health Care Screening recommendations as per orders if indicated.  Referrals offered: PT/OT/Nutrition counseling/Behavioral Health/Smoking cessation as per orders if indicated.    Discussion today about general wellness and lifestyle habits:    · Prevent falls and reduce trip hazards; Cautioned about securing or removing rugs.  · Have a working fire alarm and carbon monoxide detector;   · Engage in regular physical activity and social activities       Follow-up: No follow-ups on file.

## 2021-12-07 ENCOUNTER — OFFICE VISIT (OUTPATIENT)
Dept: SLEEP MEDICINE | Facility: MEDICAL CENTER | Age: 71
End: 2021-12-07
Payer: MEDICARE

## 2021-12-07 VITALS
DIASTOLIC BLOOD PRESSURE: 88 MMHG | OXYGEN SATURATION: 96 % | HEART RATE: 77 BPM | BODY MASS INDEX: 20.44 KG/M2 | WEIGHT: 138 LBS | HEIGHT: 69 IN | SYSTOLIC BLOOD PRESSURE: 128 MMHG | RESPIRATION RATE: 16 BRPM

## 2021-12-07 DIAGNOSIS — G47.33 OSA (OBSTRUCTIVE SLEEP APNEA): ICD-10-CM

## 2021-12-07 PROCEDURE — 99213 OFFICE O/P EST LOW 20 MIN: CPT | Performed by: FAMILY MEDICINE

## 2021-12-07 NOTE — PROGRESS NOTES
[facility] Follow Up Note     Date: 12/7/2021 / Time: 11:46 AM    Patient ID:   Name:             Sinai Hoover   YOB: 1950  Age:                 71 y.o.  female   MRN:               [id number]      Thank you for requesting a sleep medicine consultation on Sinai Hoover at the sleep center. She presents today with the chief complaints of Nayeli follow up.     HISTORY OF PRESENT ILLNESS:       Pt is currently on autoCPAP 5-15 cmH2O. She goes to sleep around 9 pm and wakes up around 6-7 am. She is getting about 8-9 hrs of sleep on a good night. Overall,she does finds her sleep refreshing.She does/ take regular naps. The naps are usually 30-35 min long.She drinks 0 caffeinated beverages per day. She denies any symptoms of RLS, narcolepsy or any symptoms to suggest parasomnias such as nightmares, sleep walking or acting out of dreams.      She is using CPAP most days of the week. Pt reports 8 hrs of average nightly use of CPAP. Pt denies snoring, gasping,choking.Pt also denies significant mask leak that is interfering with sleep. The 30 day compliance was downloaded which shows adequate compliance with more that 4 hr usage about 90%. The AHI is has improved to 0.9/hr. The mask leak is normal. The symptoms of excessive daytime, snoring and gasping has improved with the therapy.  She is currently on Respironics CPAP.  She is aware of the recall however denies any symptoms mentioned in the recall letter.    SLEEP HISTORY     Polysomnogram from 12/8/2017 indicated evidence of moderate sleep apnea with an AHI of 15.8, REM index of 19.5 with a low 02 saturation of 82%.    OPO (date??) which showed a low oxygen saturation of 78% from a baseline oxygen saturation of 95% and the oxygen desaturation index was 9.2 per     REVIEW OF SYSTEMS:       Constitutional: Denies fevers, Denies weight changes  Eyes: Denies changes in vision, no eye pain  Ears/Nose/Throat/Mouth: Denies nasal congestion or sore  throat   Cardiovascular: Denies chest pain or palpitations   Respiratory: Denies shortness of breath , Denies cough  Gastrointestinal/Hepatic: Denies abdominal pain, nausea, vomiting, diarrhea, constipation or GI bleeding   Genitourinary: Deniesdysuria or frequency  Musculoskeletal/Rheum: Denies  joint pain and swelling   Skin/Breast: Denies rash,   Neurological: Denies headache, confusion, memory loss or focal weakness/parasthesias  Psychiatric: denies mood disorder   Sleep: denies snoring and apneas     Comprehensive review of systems form is reviewed with the patient and is attached in the EMR.     PMH:  has a past medical history of Hypothyroidism and Sleep apnea (11/2019).  MEDS:   Current Outpatient Medications:   •  levothyroxine (SYNTHROID) 88 MCG Tab, TAKE 1 TABLET BY MOUTH IN THE MORNING ON AN EMPTY STOMACH, Disp: 100 tablet, Rfl: 0  ALLERGIES:   Allergies   Allergen Reactions   • Celebrex [Celecoxib] Nausea     vertigo     SURGHX:   Past Surgical History:   Procedure Laterality Date   • KNEE ARTHROSCOPY Right 11/26/2019    Procedure: ARTHROSCOPY, KNEE;  Surgeon: Lonny Gr M.D.;  Location: [facility];  Service: Orthopedics   • MEDIAL MENISCECTOMY Right 11/26/2019    Procedure: MENISCECTOMY, KNEE, MEDIAL - FOR PARTIAL LATERAL;  Surgeon: Lonny Gr M.D.;  Location: [facility];  Service: Orthopedics   • CHONDROPLASTY Right 11/26/2019    Procedure: CHONDROPLASTY;  Surgeon: Lonny Gr M.D.;  Location: [facility];  Service: Orthopedics   • HIP REPLACEMENT, TOTAL Left 2016   • MENISCECTOMY Left 1994   • DILATION AND CURETTAGE  1992   • COLONOSCOPY  2001, 2009    recheck age 64y   • US-NEEDLE CORE BX-BREAST PANEL    2004 ?     left breast, benign     SOCHX:  reports that she has never smoked. She has never used smokeless tobacco. She reports current alcohol use. She reports that she does not use drugs..  FH:   Family History   Problem  "Relation Age of Onset   • Alcohol/Drug Mother    • Heart Disease Father    • Alcohol/Drug Brother          Physical Exam:  Vitals/ General Appearance:   Weight/BMI: Body mass index is 20.38 kg/m².  /88 (BP Location: Left arm, Patient Position: Sitting, BP Cuff Size: Adult)   Pulse 77   Resp 16   Ht 1.753 m (5' 9\")   Wt 62.6 kg (138 lb)   SpO2 96%   Vitals:    12/07/21 1141   BP: 128/88   BP Location: Left arm   Patient Position: Sitting   BP Cuff Size: Adult   Pulse: 77   Resp: 16   SpO2: 96%   Weight: 62.6 kg (138 lb)   Height: 1.753 m (5' 9\")       Pt. is alert and oriented to time, place and person. Cooperative and in no apparent distress.       Constitutional: Alert, no distress, well-groomed.  Skin: No rashes in visible areas.  Eye: Round. Conjunctiva clear, lids normal. No icterus.   ENMT: Lips pink without lesions, good dentition, moist mucous membranes. Phonation normal.  Neck: No masses, no thyromegaly. Moves freely without pain.  CV: Pulse as reported by patient  Respiratory: Unlabored respiratory effort, no cough or audible wheeze  Psych: Alert and oriented x3, normal affect and mood.     ASSESSMENT AND PLAN     1. Sleep Apnea .The pathophysiology of sleep anea and the increased risk of cardiovascular morbidity from untreated sleep apnea is discussed in detail with the patient.   She is urged to avoid supine sleep, weight gain and alcoholic beverages since all of these can worsen sleep apnea. She is cautioned against drowsy driving. If She feels sleepy while driving, She must pull over for a break/nap, rather than persist on the road, in the interest of She own safety and that of others on the road.   Plan   - Continue ACPAP 5-15 cm with dreamwear nasal pillow mask    - After informed discussion new CPAP is ordered today    - F/u in 8-10 weeks to assess the efficiacy of recommended pressure    - Compliance download was reviewed and discussed with the pt   - Compliance was reinforced     2. " Regarding treatment of other past medical problems and general health maintenance,  She is urged to follow up with PCP.

## 2021-12-09 ENCOUNTER — HOSPITAL ENCOUNTER (OUTPATIENT)
Dept: LAB | Facility: MEDICAL CENTER | Age: 71
End: 2021-12-09
Attending: FAMILY MEDICINE
Payer: MEDICARE

## 2021-12-09 DIAGNOSIS — G47.30 SLEEP APNEA, UNSPECIFIED TYPE: ICD-10-CM

## 2021-12-09 DIAGNOSIS — Z13.6 SCREENING FOR ISCHEMIC HEART DISEASE: ICD-10-CM

## 2021-12-09 DIAGNOSIS — E03.9 HYPOTHYROIDISM, UNSPECIFIED TYPE: ICD-10-CM

## 2021-12-09 DIAGNOSIS — Z11.59 NEED FOR HEPATITIS C SCREENING TEST: ICD-10-CM

## 2021-12-09 DIAGNOSIS — Z13.1 SCREENING FOR DIABETES MELLITUS (DM): ICD-10-CM

## 2021-12-09 DIAGNOSIS — G47.33 OSA (OBSTRUCTIVE SLEEP APNEA): ICD-10-CM

## 2021-12-09 LAB
ANION GAP SERPL CALC-SCNC: 9 MMOL/L (ref 7–16)
BUN SERPL-MCNC: 21 MG/DL (ref 8–22)
CALCIUM SERPL-MCNC: 9.4 MG/DL (ref 8.5–10.5)
CHLORIDE SERPL-SCNC: 105 MMOL/L (ref 96–112)
CHOLEST SERPL-MCNC: 197 MG/DL (ref 100–199)
CO2 SERPL-SCNC: 27 MMOL/L (ref 20–33)
CREAT SERPL-MCNC: 0.74 MG/DL (ref 0.5–1.4)
FASTING STATUS PATIENT QL REPORTED: NORMAL
GLUCOSE SERPL-MCNC: 87 MG/DL (ref 65–99)
HCV AB SER QL: NORMAL
HDLC SERPL-MCNC: 65 MG/DL
LDLC SERPL CALC-MCNC: 119 MG/DL
POTASSIUM SERPL-SCNC: 4 MMOL/L (ref 3.6–5.5)
SODIUM SERPL-SCNC: 141 MMOL/L (ref 135–145)
TRIGL SERPL-MCNC: 66 MG/DL (ref 0–149)
TSH SERPL DL<=0.005 MIU/L-ACNC: 2.08 UIU/ML (ref 0.38–5.33)

## 2021-12-09 PROCEDURE — 36415 COLL VENOUS BLD VENIPUNCTURE: CPT

## 2021-12-09 PROCEDURE — 84443 ASSAY THYROID STIM HORMONE: CPT

## 2021-12-09 PROCEDURE — 80048 BASIC METABOLIC PNL TOTAL CA: CPT

## 2021-12-09 PROCEDURE — 80061 LIPID PANEL: CPT

## 2021-12-09 PROCEDURE — 86803 HEPATITIS C AB TEST: CPT

## 2021-12-20 ENCOUNTER — HOSPITAL ENCOUNTER (OUTPATIENT)
Dept: RADIOLOGY | Facility: MEDICAL CENTER | Age: 71
End: 2021-12-20
Attending: FAMILY MEDICINE
Payer: MEDICARE

## 2021-12-20 DIAGNOSIS — Z12.31 VISIT FOR SCREENING MAMMOGRAM: ICD-10-CM

## 2021-12-20 PROCEDURE — 77063 BREAST TOMOSYNTHESIS BI: CPT

## 2022-02-08 RX ORDER — LEVOTHYROXINE SODIUM 88 UG/1
TABLET ORAL
Qty: 100 TABLET | Refills: 0 | Status: SHIPPED | OUTPATIENT
Start: 2022-02-08 | End: 2022-02-23 | Stop reason: SDUPTHER

## 2022-02-24 RX ORDER — LEVOTHYROXINE SODIUM 88 UG/1
TABLET ORAL
Qty: 100 TABLET | Refills: 0 | Status: SHIPPED | OUTPATIENT
Start: 2022-02-24 | End: 2022-08-15 | Stop reason: SDUPTHER

## 2022-04-15 ENCOUNTER — TELEPHONE (OUTPATIENT)
Dept: SLEEP MEDICINE | Facility: MEDICAL CENTER | Age: 72
End: 2022-04-15
Payer: MEDICARE

## 2022-04-15 NOTE — TELEPHONE ENCOUNTER
VOICEMAIL  1. Caller Name: Sinai Hoover  Call Back Number: [phone]     2. Message: Voicemail received 4/15/22 stating she still has not received her machine and has been waiting since December.  Requesting a call back.     3. Patient approves office to leave a detailed voicemail/MyChart message: yesyes

## 2022-04-18 ENCOUNTER — APPOINTMENT (OUTPATIENT)
Dept: SLEEP MEDICINE | Facility: MEDICAL CENTER | Age: 72
End: 2022-04-18
Payer: MEDICARE

## 2022-06-02 ENCOUNTER — APPOINTMENT (OUTPATIENT)
Dept: SLEEP MEDICINE | Facility: MEDICAL CENTER | Age: 72
End: 2022-06-02
Payer: MEDICARE

## 2022-07-11 ENCOUNTER — OFFICE VISIT (OUTPATIENT)
Dept: SLEEP MEDICINE | Facility: MEDICAL CENTER | Age: 72
End: 2022-07-11
Payer: MEDICARE

## 2022-07-11 VITALS
WEIGHT: 134 LBS | RESPIRATION RATE: 16 BRPM | DIASTOLIC BLOOD PRESSURE: 70 MMHG | OXYGEN SATURATION: 93 % | SYSTOLIC BLOOD PRESSURE: 118 MMHG | BODY MASS INDEX: 20.31 KG/M2 | HEART RATE: 75 BPM | HEIGHT: 68 IN

## 2022-07-11 DIAGNOSIS — G47.33 OSA (OBSTRUCTIVE SLEEP APNEA): ICD-10-CM

## 2022-07-11 PROCEDURE — 99213 OFFICE O/P EST LOW 20 MIN: CPT | Performed by: FAMILY MEDICINE

## 2022-07-11 ASSESSMENT — PATIENT HEALTH QUESTIONNAIRE - PHQ9: CLINICAL INTERPRETATION OF PHQ2 SCORE: 0

## 2022-07-11 NOTE — PROGRESS NOTES
[facility] Follow Up Note     Date: 7/11/2022 / Time: 8:32 AM    Patient ID:   Name:             Sinai Hoover   YOB: 1950  Age:                 72 y.o.  female   MRN:               [id number]      Thank you for requesting a sleep medicine consultation on Sinai Hoover at the sleep center. She presents today with the chief complaints of JENNIFER follow up.     HISTORY OF PRESENT ILLNESS:       Pt is currently on ACPAP 5-15 cm. She goes to sleep around 9 pm and wakes up around 6:30-7 am. She is getting about 8 hrs of sleep on a good night. Overall, she does finds her sleep refreshing.She does take regular naps. The naps are usually 60 min long.She denies any symptoms of RLS, narcolepsy or any symptoms to suggest parasomnias such as nightmares, sleep walking or acting out of dreams.  She is using CPAP most days of the week. Pt reports 7 hrs of average nightly use of CPAP. Pt denies snoring, gasping,choking.Pt also denies significant mask leak that is interfering with sleep. The 30 day compliance was downloaded which shows adequate compliance with more that 4 hr usage about 96%. The AHI is has improved to 0.9/hr. The mask leak is normal. The symptoms of excessive daytime, snoring and gasping has improved with the therapy.     SLEEP HISTORY   Polysomnogram from 12/8/2017 indicated evidence of moderate sleep apnea with an AHI of 15.8, REM index of 19.5 with a low 02 saturation of 82%.     OPO (date??) which showed a low oxygen saturation of 78% from a baseline oxygen saturation of 95% and the oxygen desaturation index was 9.2 per       REVIEW OF SYSTEMS:       Constitutional: Denies fevers, Denies weight changes  Eyes: Denies changes in vision, no eye pain  Ears/Nose/Throat/Mouth: Denies nasal congestion or sore throat   Cardiovascular: Denies chest pain or palpitations   Respiratory: Denies shortness of breath , Denies cough  Gastrointestinal/Hepatic: Denies abdominal pain, nausea, vomiting,  diarrhea, constipation or GI bleeding   Genitourinary: Deniesdysuria or frequency  Musculoskeletal/Rheum: Denies  joint pain and swelling   Skin/Breast: Denies rash,   Neurological: Denies headache, confusion, memory loss or focal weakness/parasthesias  Psychiatric: denies mood disorder   Sleep: denies snoring and EDS    Comprehensive review of systems form is reviewed with the patient and is attached in the EMR.     PMH:  has a past medical history of Hypothyroidism and Sleep apnea (11/2019).  MEDS:   Current Outpatient Medications:   •  levothyroxine (SYNTHROID) 88 MCG Tab, TAKE 1 TABLET BY MOUTH IN THE MORNING ON AN EMPTY STOMACH, Disp: 100 Tablet, Rfl: 0  ALLERGIES:   Allergies   Allergen Reactions   • Celebrex [Celecoxib] Nausea     vertigo     SURGHX:   Past Surgical History:   Procedure Laterality Date   • KNEE ARTHROSCOPY Right 11/26/2019    Procedure: ARTHROSCOPY, KNEE;  Surgeon: Lonny Gr M.D.;  Location: [facility];  Service: Orthopedics   • MENISCECTOMY, KNEE, MEDIAL Right 11/26/2019    Procedure: MENISCECTOMY, KNEE, MEDIAL - FOR PARTIAL LATERAL;  Surgeon: Lonny Gr M.D.;  Location: [facility];  Service: Orthopedics   • CHONDROPLASTY Right 11/26/2019    Procedure: CHONDROPLASTY;  Surgeon: Lonny Gr M.D.;  Location: [facility];  Service: Orthopedics   • HIP REPLACEMENT, TOTAL Left 2016   • MENISCECTOMY Left 1994   • DILATION AND CURETTAGE  1992   • COLONOSCOPY  2001, 2009    recheck age 64y   • US-NEEDLE CORE BX-BREAST PANEL    2004 ?     left breast, benign     SOCHX:  reports that she has never smoked. She has never used smokeless tobacco. She reports current alcohol use. She reports that she does not use drugs..  FH:   Family History   Problem Relation Age of Onset   • Alcohol/Drug Mother    • Heart Disease Father    • Alcohol/Drug Brother          Physical Exam:  Vitals/ General Appearance:   Weight/BMI: Body mass index  "is 20.37 kg/m².  /70 (BP Location: Left arm, Patient Position: Sitting, BP Cuff Size: Adult)   Pulse 75   Resp 16   Ht 1.727 m (5' 8\")   Wt 60.8 kg (134 lb)   SpO2 93%   Vitals:    07/11/22 0825   BP: 118/70   BP Location: Left arm   Patient Position: Sitting   BP Cuff Size: Adult   Pulse: 75   Resp: 16   SpO2: 93%   Weight: 60.8 kg (134 lb)   Height: 1.727 m (5' 8\")       Pt. is alert and oriented to time, place and person. Cooperative and in no apparent distress.       Constitutional: Alert, no distress, well-groomed.  Skin: No rashes in visible areas.  Eye: Round. Conjunctiva clear, lids normal. No icterus.   ENMT: Lips pink without lesions, good dentition, moist mucous membranes. Phonation normal.  Neck: No masses, no thyromegaly. Moves freely without pain.  CV: Pulse as reported by patient  Respiratory: Unlabored respiratory effort, no cough or audible wheeze  Psych: Alert and oriented x3, normal affect and mood.     ASSESSMENT AND PLAN     1.Obstructive Sleep Apnea   The pathophysiology of sleep anea and the increased risk of cardiovascular morbidity from untreated sleep apnea is discussed in detail with the patient.She is urged to avoid supine sleep, weight gain and alcoholic beverages since all of these can worsen sleep apnea. She is cautioned against drowsy driving. If She feels sleepy while driving, She must pull over for a break/nap, rather than persist on the road, in the interest of She own safety and that of others on the road.   Plan   - Continue ACPAP 5-15 cm with dreamwear nasal pillow mask    - Supplies are refilled    - compliance download was reviewed and discussed with the pt   - compliance was reinforced     2. Regarding treatment of other past medical problems and general health maintenance,  She is urged to follow up with PCP.    "

## 2022-07-12 ENCOUNTER — OFFICE VISIT (OUTPATIENT)
Dept: MEDICAL GROUP | Facility: MEDICAL CENTER | Age: 72
End: 2022-07-12
Payer: MEDICARE

## 2022-07-12 VITALS
SYSTOLIC BLOOD PRESSURE: 122 MMHG | HEIGHT: 68 IN | RESPIRATION RATE: 18 BRPM | BODY MASS INDEX: 20.31 KG/M2 | DIASTOLIC BLOOD PRESSURE: 60 MMHG | WEIGHT: 134.04 LBS | OXYGEN SATURATION: 94 % | TEMPERATURE: 98.1 F | HEART RATE: 70 BPM

## 2022-07-12 DIAGNOSIS — R19.7 DIARRHEA, UNSPECIFIED TYPE: ICD-10-CM

## 2022-07-12 PROCEDURE — 99213 OFFICE O/P EST LOW 20 MIN: CPT | Performed by: FAMILY MEDICINE

## 2022-07-12 NOTE — ASSESSMENT & PLAN NOTE
The patient describes 11 days of watery, nonbloody stool that started four hours after she ate from a charTouchotelie plate. Originally she would stool about every 4 hours but this has reduced over the past two days. She is now having about 2 - 2 1/2 soft watery stools each day. Fruit makes her symptoms worse. She has taken two home COVID tests and these were negative. She denies nausea and abdominal pain but does have some cramping right before she stools. She has been using the BRAT diet. She denies fever, chills, vomiting. Her last cologuard was 10 months ago and was normal. She tells me that 15 years ago she had a small adenomatous polyp removed. Her last colonoscopy was 8 years ago and was normal. She denies recent international travel and recent antibiotic use.

## 2022-07-12 NOTE — PROGRESS NOTES
"[facility]  Progress Note  Established Patient    Subjective:   Sinai Hoover is a 72 y.o. female here today with a chief complaint of diarrhea. The patient is alone.     Diarrhea  The patient describes 11 days of watery, nonbloody stool that started four hours after she ate from a charcuterie plate. Originally she would stool about every 4 hours but this has reduced over the past two days. She is now having about 2 - 2 1/2 soft watery stools each day. Fruit makes her symptoms worse. She has taken two home COVID tests and these were negative. She denies nausea and abdominal pain but does have some cramping right before she stools. She has been using the BRAT diet. She denies fever, chills, vomiting. Her last cologuard was 10 months ago and was normal. She tells me that 15 years ago she had a small adenomatous polyp removed. Her last colonoscopy was 8 years ago and was normal. She denies recent international travel and recent antibiotic use.       Current Outpatient Medications on File Prior to Visit   Medication Sig Dispense Refill   • Loperamide HCl (IMODIUM PO) Take  by mouth.     • levothyroxine (SYNTHROID) 88 MCG Tab TAKE 1 TABLET BY MOUTH IN THE MORNING ON AN EMPTY STOMACH 100 Tablet 0     No current facility-administered medications on file prior to visit.          Objective:     Vitals:    07/12/22 1455   BP: 122/60   BP Location: Left arm   Patient Position: Sitting   BP Cuff Size: Adult long   Pulse: 70   Resp: 18   Temp: 36.7 °C (98.1 °F)   TempSrc: Temporal   SpO2: 94%   Weight: 60.8 kg (134 lb 0.6 oz)   Height: 1.727 m (5' 8\")       Physical Exam:  General: alert in no apparent distress.   Abd: soft, NTND, no rebound, no guarding. Slightly hyperactive BS.         Assessment and Plan:     1. Diarrhea, unspecified type  I suspect the patient had food poisoning which set off a bit of IBS-D. I recommended the low FODMAP diet (handout provided) and one cup of peppermint tea twice or thrice daily. " We'll get the following labs as well. I'll arrange for her to f/u with her PCP. If no resolution, consider GI consult and stool studies.   - CBC WITH DIFFERENTIAL; Future  - Comp Metabolic Panel; Future  - TSH; Future  - CELIAC DISEASE AB PANEL; Future        Followup: Return if symptoms worsen or fail to improve.

## 2022-07-13 ENCOUNTER — HOSPITAL ENCOUNTER (OUTPATIENT)
Dept: LAB | Facility: MEDICAL CENTER | Age: 72
End: 2022-07-13
Attending: FAMILY MEDICINE
Payer: MEDICARE

## 2022-07-13 DIAGNOSIS — R19.7 DIARRHEA, UNSPECIFIED TYPE: ICD-10-CM

## 2022-07-13 LAB
ALBUMIN SERPL BCP-MCNC: 4.3 G/DL (ref 3.2–4.9)
ALBUMIN/GLOB SERPL: 2 G/DL
ALP SERPL-CCNC: 60 U/L (ref 30–99)
ALT SERPL-CCNC: 42 U/L (ref 2–50)
ANION GAP SERPL CALC-SCNC: 9 MMOL/L (ref 7–16)
AST SERPL-CCNC: 27 U/L (ref 12–45)
BASOPHILS # BLD AUTO: 0.6 % (ref 0–1.8)
BASOPHILS # BLD: 0.03 K/UL (ref 0–0.12)
BILIRUB SERPL-MCNC: 0.4 MG/DL (ref 0.1–1.5)
BUN SERPL-MCNC: 10 MG/DL (ref 8–22)
CALCIUM SERPL-MCNC: 8.9 MG/DL (ref 8.5–10.5)
CHLORIDE SERPL-SCNC: 104 MMOL/L (ref 96–112)
CO2 SERPL-SCNC: 26 MMOL/L (ref 20–33)
CREAT SERPL-MCNC: 0.7 MG/DL (ref 0.5–1.4)
EOSINOPHIL # BLD AUTO: 0.07 K/UL (ref 0–0.51)
EOSINOPHIL NFR BLD: 1.5 % (ref 0–6.9)
ERYTHROCYTE [DISTWIDTH] IN BLOOD BY AUTOMATED COUNT: 40.4 FL (ref 35.9–50)
GFR SERPLBLD CREATININE-BSD FMLA CKD-EPI: 92 ML/MIN/1.73 M 2
GLOBULIN SER CALC-MCNC: 2.2 G/DL (ref 1.9–3.5)
GLUCOSE SERPL-MCNC: 87 MG/DL (ref 65–99)
HCT VFR BLD AUTO: 41.5 % (ref 37–47)
HGB BLD-MCNC: 13.8 G/DL (ref 12–16)
IMM GRANULOCYTES # BLD AUTO: 0.02 K/UL (ref 0–0.11)
IMM GRANULOCYTES NFR BLD AUTO: 0.4 % (ref 0–0.9)
LYMPHOCYTES # BLD AUTO: 1.23 K/UL (ref 1–4.8)
LYMPHOCYTES NFR BLD: 26.1 % (ref 22–41)
MCH RBC QN AUTO: 28.8 PG (ref 27–33)
MCHC RBC AUTO-ENTMCNC: 33.3 G/DL (ref 33.6–35)
MCV RBC AUTO: 86.6 FL (ref 81.4–97.8)
MONOCYTES # BLD AUTO: 0.56 K/UL (ref 0–0.85)
MONOCYTES NFR BLD AUTO: 11.9 % (ref 0–13.4)
NEUTROPHILS # BLD AUTO: 2.8 K/UL (ref 2–7.15)
NEUTROPHILS NFR BLD: 59.5 % (ref 44–72)
NRBC # BLD AUTO: 0 K/UL
NRBC BLD-RTO: 0 /100 WBC
PLATELET # BLD AUTO: 242 K/UL (ref 164–446)
PMV BLD AUTO: 11 FL (ref 9–12.9)
POTASSIUM SERPL-SCNC: 3.8 MMOL/L (ref 3.6–5.5)
PROT SERPL-MCNC: 6.5 G/DL (ref 6–8.2)
RBC # BLD AUTO: 4.79 M/UL (ref 4.2–5.4)
SODIUM SERPL-SCNC: 139 MMOL/L (ref 135–145)
TSH SERPL DL<=0.005 MIU/L-ACNC: 4.69 UIU/ML (ref 0.38–5.33)
WBC # BLD AUTO: 4.7 K/UL (ref 4.8–10.8)

## 2022-07-13 PROCEDURE — 83516 IMMUNOASSAY NONANTIBODY: CPT

## 2022-07-13 PROCEDURE — 85025 COMPLETE CBC W/AUTO DIFF WBC: CPT

## 2022-07-13 PROCEDURE — 84443 ASSAY THYROID STIM HORMONE: CPT

## 2022-07-13 PROCEDURE — 36415 COLL VENOUS BLD VENIPUNCTURE: CPT

## 2022-07-13 PROCEDURE — 80053 COMPREHEN METABOLIC PANEL: CPT

## 2022-07-13 PROCEDURE — 82784 ASSAY IGA/IGD/IGG/IGM EACH: CPT

## 2022-07-14 LAB — IGA SERPL-MCNC: 60 MG/DL (ref 68–408)

## 2022-07-15 LAB — GLIADIN PEPTIDE+TTG IGA+IGG SER QL IA: 7 UNITS (ref 0–19)

## 2022-08-11 ENCOUNTER — OFFICE VISIT (OUTPATIENT)
Dept: MEDICAL GROUP | Facility: MEDICAL CENTER | Age: 72
End: 2022-08-11
Payer: MEDICARE

## 2022-08-11 VITALS
HEIGHT: 68 IN | SYSTOLIC BLOOD PRESSURE: 112 MMHG | TEMPERATURE: 96.8 F | OXYGEN SATURATION: 98 % | RESPIRATION RATE: 16 BRPM | HEART RATE: 66 BPM | BODY MASS INDEX: 20.05 KG/M2 | DIASTOLIC BLOOD PRESSURE: 60 MMHG | WEIGHT: 132.28 LBS

## 2022-08-11 DIAGNOSIS — R19.5 ABNORMAL STOOLS: ICD-10-CM

## 2022-08-11 DIAGNOSIS — R19.00 INTRA-ABDOMINAL AND PELVIC SWELLING, MASS AND LUMP, UNSPECIFIED SITE: ICD-10-CM

## 2022-08-11 PROCEDURE — 99214 OFFICE O/P EST MOD 30 MIN: CPT | Performed by: FAMILY MEDICINE

## 2022-08-11 ASSESSMENT — FIBROSIS 4 INDEX: FIB4 SCORE: 1.22

## 2022-08-11 NOTE — ASSESSMENT & PLAN NOTE
RLQ pain x 3 weeks   Pretty persistent   Not positional   With abnormal stools     Reviewed labs and most recent visit with primary care    Plan for CT scan GI consult and 1 mo follow up with me     30+ min spent

## 2022-08-11 NOTE — PROGRESS NOTES
This medical record contains text that has been entered with the assistance of computer voice recognition and dictation software.  Therefore, it may contain unintended errors in text, spelling, punctuation, or grammar        Chief Complaint   Patient presents with    Other     Follow up after diarrhea, has resolves. She does now have lower right ABD pain after her diarrhea resolved. Intermittent pain for about 1 month    Lab Results     Celiac panel       Sinai Hoover is a 72 y.o. female here evaluation and management of:       Current Outpatient Medications   Medication Sig Dispense Refill    levothyroxine (SYNTHROID) 88 MCG Tab TAKE 1 TABLET BY MOUTH IN THE MORNING ON AN EMPTY STOMACH 100 Tablet 0    Loperamide HCl (IMODIUM PO) Take  by mouth. (Patient not taking: Reported on 8/11/2022)       No current facility-administered medications for this visit.     Patient Active Problem List    Diagnosis Date Noted    Abnormal stools 08/11/2022    Intra-abdominal and pelvic swelling, mass and lump, unspecified site 08/11/2022    Diarrhea 07/12/2022    Medicare annual wellness visit, subsequent 11/18/2021    Dizziness 07/22/2020    Hearing loss of left ear 07/22/2020    Left ear pain 07/22/2020    Elevated blood pressure reading in office without diagnosis of hypertension 07/22/2020    Preop examination 11/13/2019    HSV infection 09/12/2019    History of anemia 09/12/2019    Wart on thumb 09/12/2019    JENNIFER (obstructive sleep apnea) 02/14/2018    Hypothyroidism      Past Surgical History:   Procedure Laterality Date    KNEE ARTHROSCOPY Right 11/26/2019    Procedure: ARTHROSCOPY, KNEE;  Surgeon: Lonny Gr M.D.;  Location: SURGERY [facility];  Service: Orthopedics    MENISCECTOMY, KNEE, MEDIAL Right 11/26/2019    Procedure: MENISCECTOMY, KNEE, MEDIAL - FOR PARTIAL LATERAL;  Surgeon: Lonny Gr M.D.;  Location: SURGERY [facility];  Service: Orthopedics    CHONDROPLASTY Right 11/26/2019  "   Procedure: CHONDROPLASTY;  Surgeon: Lonny Gr M.D.;  Location: SURGERY [facility];  Service: Orthopedics    HIP REPLACEMENT, TOTAL Left 2016    MENISCECTOMY Left 1994    DILATION AND CURETTAGE  1992    COLONOSCOPY  2001, 2009    recheck age 64y    US-NEEDLE CORE BX-BREAST PANEL    2004 ?     left breast, benign      Social History     Tobacco Use    Smoking status: Never    Smokeless tobacco: Never   Vaping Use    Vaping Use: Never used   Substance Use Topics    Alcohol use: Yes     Comment: OCC    Drug use: No     Family History   Problem Relation Age of Onset    Alcohol/Drug Mother     Heart Disease Father     Alcohol/Drug Brother            ROS    all review of system completed and negative except for those listed above     Objective:     /60 (BP Location: Left arm, Patient Position: Sitting, BP Cuff Size: Small adult)   Pulse 66   Temp 36 °C (96.8 °F) (Temporal)   Resp 16   Ht 1.727 m (5' 8\")   Wt 60 kg (132 lb 4.4 oz)   SpO2 98%  Body mass index is 20.11 kg/m².  Physical Exam:    Constitutional: Alert, no distress.  Skin: Warm, dry, good turgor, no rashes in visible areas.  Eye: Equal, round and reactive, conjunctiva clear, lids normal.  ENMT: Lips without lesions, good dentition, oropharynx clear.  Neck: Trachea midline, no masses, no thyromegaly. No cervical or supraclavicular lymphadenopathy.  Respiratory: Unlabored respiratory effort, lungs clear to auscultation, no wheezes, no ronchi.  Cardiovascular: Normal S1, S2, no murmur, no edema.  Abdomen: Soft, non-tender, no masses, no hepatosplenomegaly.  Psych: Alert and oriented x3, normal affect and mood.          Assessment and Plan:   The following treatment plan was discussed        Problem List Items Addressed This Visit       Abnormal stools    Relevant Orders    CT-ABDOMEN WITH & W/O    Referral to GI for Colonoscopy    Intra-abdominal and pelvic swelling, mass and lump, unspecified site     RLQ pain x 3 weeks   Pretty " persistent   Not positional   With abnormal stools     Reviewed labs and most recent visit with primary care    Plan for CT scan GI consult and 1 mo follow up with me     30+ min spent              Relevant Orders    CT-ABDOMEN WITH & W/O    Referral to GI for Colonoscopy             Instructed to follow up if symptoms worsen or fail to improve, ER/UC precautions discussed as well    Nilda Pinto MD  [facility], Family Medicine   [address] [place] [address] [address]   [place] [id number]  Phone: [phone]

## 2022-08-12 ENCOUNTER — HOSPITAL ENCOUNTER (OUTPATIENT)
Dept: RADIOLOGY | Facility: MEDICAL CENTER | Age: 72
End: 2022-08-12
Attending: FAMILY MEDICINE
Payer: MEDICARE

## 2022-08-12 DIAGNOSIS — R19.00 INTRA-ABDOMINAL AND PELVIC SWELLING, MASS AND LUMP, UNSPECIFIED SITE: ICD-10-CM

## 2022-08-12 DIAGNOSIS — R19.5 ABNORMAL STOOLS: ICD-10-CM

## 2022-08-12 PROCEDURE — 74177 CT ABD & PELVIS W/CONTRAST: CPT | Mod: ME

## 2022-08-12 PROCEDURE — 700117 HCHG RX CONTRAST REV CODE 255: Performed by: FAMILY MEDICINE

## 2022-08-12 RX ADMIN — IOHEXOL 100 ML: 350 INJECTION, SOLUTION INTRAVENOUS at 17:30

## 2022-08-16 DIAGNOSIS — R93.89 ABNORMAL CT SCAN: ICD-10-CM

## 2022-08-16 RX ORDER — LEVOTHYROXINE SODIUM 88 UG/1
TABLET ORAL
Qty: 90 TABLET | Refills: 0 | OUTPATIENT
Start: 2022-08-16

## 2022-08-16 RX ORDER — LEVOTHYROXINE SODIUM 88 UG/1
TABLET ORAL
Qty: 100 TABLET | Refills: 0 | Status: SHIPPED | OUTPATIENT
Start: 2022-08-16 | End: 2022-11-08

## 2022-08-17 ENCOUNTER — TELEPHONE (OUTPATIENT)
Dept: MEDICAL GROUP | Facility: MEDICAL CENTER | Age: 72
End: 2022-08-17
Payer: MEDICARE

## 2022-08-17 NOTE — TELEPHONE ENCOUNTER
----- Message from Nilda Pinto M.D. sent at 8/16/2022  2:33 PM PDT -----  I went ahead and ordered the US recommended by radiology   Lets make sure she has a follow up appt to review results with me scheduled

## 2022-09-20 ENCOUNTER — TELEPHONE (OUTPATIENT)
Dept: MEDICAL GROUP | Facility: MEDICAL CENTER | Age: 72
End: 2022-09-20
Payer: MEDICARE

## 2022-09-20 ENCOUNTER — HOSPITAL ENCOUNTER (OUTPATIENT)
Dept: RADIOLOGY | Facility: MEDICAL CENTER | Age: 72
End: 2022-09-20
Attending: FAMILY MEDICINE
Payer: MEDICARE

## 2022-09-20 DIAGNOSIS — N83.209 CYST OF OVARY, UNSPECIFIED LATERALITY: ICD-10-CM

## 2022-09-20 DIAGNOSIS — R93.89 ABNORMAL CT SCAN: ICD-10-CM

## 2022-09-20 PROCEDURE — 76830 TRANSVAGINAL US NON-OB: CPT

## 2022-11-08 RX ORDER — LEVOTHYROXINE SODIUM 88 UG/1
TABLET ORAL
Qty: 100 TABLET | Refills: 0 | Status: SHIPPED | OUTPATIENT
Start: 2022-11-08 | End: 2023-02-21

## 2022-11-11 ENCOUNTER — PATIENT MESSAGE (OUTPATIENT)
Dept: HEALTH INFORMATION MANAGEMENT | Facility: OTHER | Age: 72
End: 2022-11-11

## 2022-12-01 NOTE — PROGRESS NOTES
[facility] Follow-up Visit    Date of Visit: 12/19/2022     CC: Follow-up for JENNIFER management      HPI:  Sinai Hoover is a very pleasant 72 y.o. year old female never smoker, with a PMHx of JENNIFER, hypothyroidism who presented to the [facility] for a regular follow up. Last seen in the office on 7/11/2022 with Dr. Viera.    Patient presents for compliance.  Patient states that her recall machine has not arrived.  Her original machine have also talked from being wireless data, with the last data being in July.  She is not due for the machine until January 15, 2023.  She states that she has some daytime drowsiness and takes a nap once a day for about 30-60 minutes at a time.  She denies any significant morning headaches, driving drowsy, issues with sleep, snoring, gasping, apneas, palpitations, dry mouth, aerophagia, mask leak, skin irritation, or any symptoms of RLS, narcolepsy or any nightmares, sleep walking or acting out of dreams.  She goes to bed between 9-9:30 AM and wakes up between 6-6:30 PM.  She does not have any awakenings at night.    DME provider: [facility]  Device: [employer] REMstar  Settings: AutoCPAP 5-15 CWP  When: January 15, 2018  Mask: Nasal pillows  Chin strap: No     Cleaning regimen: Once a week with soap and water    Compliance:  Compliance data reviewed showing 96.7% usage > 4hours in last 30 days. Average AHI 0.9 events/hour.  Mean pressure 5.6 CWP.  Average time in large leak per day 1 minute and 22 seconds. Patient continues to use and benefit from machine.      Sleep History:  PSG 12/8/2017-        Patient Active Problem List    Diagnosis Date Noted    JENNIFER on CPAP 02/14/2018    Abnormal stools 08/11/2022    Intra-abdominal and pelvic swelling, mass and lump, unspecified site 08/11/2022    Diarrhea 07/12/2022    Medicare annual wellness visit, subsequent 11/18/2021    Dizziness 07/22/2020    Hearing loss of left ear 07/22/2020    Left ear pain 07/22/2020    Elevated blood  pressure reading in office without diagnosis of hypertension 07/22/2020    Preop examination 11/13/2019    HSV infection 09/12/2019    History of anemia 09/12/2019    Wart on thumb 09/12/2019    Hypothyroidism      Past Medical History:   Diagnosis Date    Hypothyroidism     Sleep apnea 11/2019    Autopap      Past Surgical History:   Procedure Laterality Date    KNEE ARTHROSCOPY Right 11/26/2019    Procedure: ARTHROSCOPY, KNEE;  Surgeon: Lonny Gr M.D.;  Location: SURGERY [facility];  Service: Orthopedics    MENISCECTOMY, KNEE, MEDIAL Right 11/26/2019    Procedure: MENISCECTOMY, KNEE, MEDIAL - FOR PARTIAL LATERAL;  Surgeon: Lonny Gr M.D.;  Location: SURGERY [facility];  Service: Orthopedics    CHONDROPLASTY Right 11/26/2019    Procedure: CHONDROPLASTY;  Surgeon: Lonny Gr M.D.;  Location: SURGERY [facility];  Service: Orthopedics    HIP REPLACEMENT, TOTAL Left 2016    MENISCECTOMY Left 1994    DILATION AND CURETTAGE  1992    COLONOSCOPY  2001, 2009    recheck age 64y    US-NEEDLE CORE BX-BREAST PANEL    2004 ?     left breast, benign     Family History   Problem Relation Age of Onset    Alcohol/Drug Mother     Heart Disease Father     Alcohol/Drug Brother      Social History     Socioeconomic History    Marital status:      Spouse name: Not on file    Number of children: Not on file    Years of education: Not on file    Highest education level: Not on file   Occupational History    Not on file   Tobacco Use    Smoking status: Never    Smokeless tobacco: Never   Vaping Use    Vaping Use: Never used   Substance and Sexual Activity    Alcohol use: Yes     Comment: OCC    Drug use: No    Sexual activity: Yes     Partners: Male     Comment: , interior design, 3 daughters   Other Topics Concern    Not on file   Social History Narrative    Not on file     Social Determinants of Health     Financial Resource Strain: Not on file   Food Insecurity: Not  "on file   Transportation Needs: Not on file   Physical Activity: Not on file   Stress: Not on file   Social Connections: Not on file   Intimate Partner Violence: Not on file   Housing Stability: Not on file     Current Outpatient Medications   Medication Sig Dispense Refill    levothyroxine (SYNTHROID) 88 MCG Tab TAKE 1 TABLET BY MOUTH IN THE MORNING ON AN EMPTY STOMACH 100 Tablet 0    Loperamide HCl (IMODIUM PO) Take  by mouth. (Patient not taking: Reported on 8/11/2022)       No current facility-administered medications for this visit.      ALLERGIES: Celebrex [celecoxib]    ROS:  Constitutional: Denies fever, chills, sweats,  weight loss, fatigue  Cardiovascular: Denies chest pain, tightness, palpitations, swelling in legs/feet  Respiratory: Denies shortness of breath, cough, sputum, wheezing, painful breathing   Sleep: per HPI  Gastrointestinal: Denies  difficulty swallowing, nausea, abdominal pain, diarrhea, constipation, heartburn.  Musculoskeletal: Denies painful joints, sore muscles,       PHYSICAL EXAM:  /68 (BP Location: Right arm, Patient Position: Sitting, BP Cuff Size: Adult)   Pulse 64   Resp 16   Ht 1.727 m (5' 8\")   Wt 62.6 kg (138 lb)   SpO2 97% Comment: room air at rest  BMI 20.98 kg/m²   Appearance: Well-nourished, well-developed, no acute distress  Eyes:  No scleral icterus , EOMI  ENMT: No redness of the oropharynx  Lung auscultation:  No wheezes rhonchi rubs or rales  Cardiac: No murmurs, rubs, or gallops; regular rhythm, normal rate; no edema  Musculoskeletal:  Grossly normal; gait and station normal; digits and nails normal  Skin:  No rashes, petechiae, cyanosis  Neurologic: without focal signs; oriented to person, time, place, and purpose; judgement intact  Psychiatric:  No depression, anxiety, agitation  Mallampati score: Class III    Assessment and Plan:    The medical record was reviewed.    Diagnostic and titration nocturnal polysomnogram's, home sleep apnea tests, continuous " nocturnal oximetry results, multiple sleep latency tests, and compliance reports reviewed.    Problem List Items Addressed This Visit       JENNFIER on CPAP     Sleep Apnea:    The pathophysiology of sleep anea and the increased risk of cardiovascular morbidity from untreated sleep apnea is discussed in detail with the patient.  Urged to avoid supine sleep, weight gain and alcoholic beverages since all of these can worsen sleep apnea. Cautioned against drowsy driving. If feeling sleepy while driving, pull over for a break/nap, rather than persist on the road, in the interest of own safety and that of others on the road.    Compliance download was reviewed and discussed with the patient.     - Order placed for AutoCPAP 5-15 CWP after 1/15/2023   - Compliance was reinforced  - Advised patient to reach out via [employer] if any questions or concerns should arise.   - Equipment replacement schedule:  Mask cushion every month  Nasal pillows 2 times per month  Mask every 6 months  Head gear every 6 months  Tubing every 3 months  Ultra-fine filters 2 times per month  Foam filter every 6 months  Humidifier chamber every 6 months  Chin strap every 6 months    Has been advised to continue the current AutoCPAP 5-15 CWP, clean equipment frequently, and get new mask and supplies as allowed by insurance and DME. Recommend an earlier appointment, if significant treatment barriers develop.    The risks of untreated sleep apnea were discussed with the patient at length. Patients with JENNIFER are at increased risk of cardiovascular disease including coronary artery disease, systemic arterial hypertension, pulmonary arterial hypertension, cardiac arrythmias, and stroke. The patient was advised to avoid driving a motor vehicle when drowsy.    Positive airway pressure will favorably impact many of the adverse conditions and effects provoked by JENNIFER.             Have advised the patient to follow up with the appropriate healthcare practitioners for  all other medical problems and issues.    Return in about 3 months (around 3/19/2023), or if symptoms worsen or fail to improve, for 1st compliance .      Please note portions of this record was created using voice recognition software. I have made every reasonable attempt to correct obvious errors, but I expect that there are errors of grammar and possibly content I did not discover before finalizing the note.    Time spent in record review prior to patient arrival, reviewing results, and in face-to-face encounter totaled 20 min.  __________  LIZBETH Hauser  Pulmonary & Sleep Medicine  [facility]

## 2022-12-19 ENCOUNTER — OFFICE VISIT (OUTPATIENT)
Dept: SLEEP MEDICINE | Facility: MEDICAL CENTER | Age: 72
End: 2022-12-19
Payer: MEDICARE

## 2022-12-19 VITALS
WEIGHT: 138 LBS | OXYGEN SATURATION: 97 % | DIASTOLIC BLOOD PRESSURE: 68 MMHG | SYSTOLIC BLOOD PRESSURE: 116 MMHG | HEART RATE: 64 BPM | BODY MASS INDEX: 20.92 KG/M2 | RESPIRATION RATE: 16 BRPM | HEIGHT: 68 IN

## 2022-12-19 DIAGNOSIS — G47.33 OSA (OBSTRUCTIVE SLEEP APNEA): ICD-10-CM

## 2022-12-19 DIAGNOSIS — G47.33 OSA ON CPAP: ICD-10-CM

## 2022-12-19 PROCEDURE — 99213 OFFICE O/P EST LOW 20 MIN: CPT

## 2022-12-19 ASSESSMENT — FIBROSIS 4 INDEX: FIB4 SCORE: 1.22

## 2022-12-19 NOTE — PATIENT INSTRUCTIONS
Please make sure that you are seen within 90 days of receiving your machine, with at least 30 days of use.  Please call scheduling at [phone] if your appointment needs to be moved to meet these parameters.     To meet compliance requirements for insurance please ensure that you use the machine at least 6/7 days a week for at least 4 or more hours a night.    Please bring your entire machine and chip to your first appointment, regardless if the machine is set up for wireless access.    Make sure you bring your chip to every follow-up appointment, regardless of wireless access.

## 2022-12-19 NOTE — ASSESSMENT & PLAN NOTE
Sleep Apnea:    The pathophysiology of sleep anea and the increased risk of cardiovascular morbidity from untreated sleep apnea is discussed in detail with the patient.  Urged to avoid supine sleep, weight gain and alcoholic beverages since all of these can worsen sleep apnea. Cautioned against drowsy driving. If feeling sleepy while driving, pull over for a break/nap, rather than persist on the road, in the interest of own safety and that of others on the road.    Compliance download was reviewed and discussed with the patient.     - Order placed for AutoCPAP 5-15 CWP after 1/15/2023   - Compliance was reinforced  - Advised patient to reach out via [employer] if any questions or concerns should arise.   - Equipment replacement schedule:  Mask cushion every month  Nasal pillows 2 times per month  Mask every 6 months  Head gear every 6 months  Tubing every 3 months  Ultra-fine filters 2 times per month  Foam filter every 6 months  Humidifier chamber every 6 months  Chin strap every 6 months    Has been advised to continue the current AutoCPAP 5-15 CWP, clean equipment frequently, and get new mask and supplies as allowed by insurance and DME. Recommend an earlier appointment, if significant treatment barriers develop.    The risks of untreated sleep apnea were discussed with the patient at length. Patients with JENNIFER are at increased risk of cardiovascular disease including coronary artery disease, systemic arterial hypertension, pulmonary arterial hypertension, cardiac arrythmias, and stroke. The patient was advised to avoid driving a motor vehicle when drowsy.    Positive airway pressure will favorably impact many of the adverse conditions and effects provoked by JENNIFER.

## 2022-12-22 ENCOUNTER — HOSPITAL ENCOUNTER (OUTPATIENT)
Dept: RADIOLOGY | Facility: MEDICAL CENTER | Age: 72
End: 2022-12-22
Attending: FAMILY MEDICINE
Payer: MEDICARE

## 2022-12-22 DIAGNOSIS — Z12.31 VISIT FOR SCREENING MAMMOGRAM: ICD-10-CM

## 2022-12-22 PROCEDURE — 77063 BREAST TOMOSYNTHESIS BI: CPT

## 2023-02-21 RX ORDER — LEVOTHYROXINE SODIUM 88 UG/1
TABLET ORAL
Qty: 90 TABLET | Refills: 2 | Status: SHIPPED | OUTPATIENT
Start: 2023-02-21 | End: 2023-11-20

## 2023-02-21 RX ORDER — LEVOTHYROXINE SODIUM 88 UG/1
TABLET ORAL
Qty: 10 TABLET | Refills: 0 | Status: SHIPPED | OUTPATIENT
Start: 2023-02-21 | End: 2023-02-21

## 2023-02-21 NOTE — TELEPHONE ENCOUNTER
Received request via: Pharmacy    Was the patient seen in the last year in this department? Yes    Does the patient have an active prescription (recently filled or refills available) for medication(s) requested? No    Does the patient have [facility] and need 100 day supply (blood pressure, diabetes and cholesterol meds only)? Patient does not have SCP    Future Appointments         Provider Department Center    2/24/2023 8:00 AM Lamar Ibarra P.A.-[facility] Pulmonary Medicine - Operated by [facility]

## 2023-02-24 ENCOUNTER — OFFICE VISIT (OUTPATIENT)
Dept: SLEEP MEDICINE | Facility: MEDICAL CENTER | Age: 73
End: 2023-02-24
Attending: PHYSICIAN ASSISTANT
Payer: MEDICARE

## 2023-02-24 VITALS
RESPIRATION RATE: 16 BRPM | BODY MASS INDEX: 21.07 KG/M2 | SYSTOLIC BLOOD PRESSURE: 122 MMHG | HEIGHT: 68 IN | OXYGEN SATURATION: 96 % | HEART RATE: 69 BPM | WEIGHT: 139 LBS | DIASTOLIC BLOOD PRESSURE: 72 MMHG

## 2023-02-24 DIAGNOSIS — G47.33 OSA ON CPAP: ICD-10-CM

## 2023-02-24 DIAGNOSIS — R53.83 FATIGUE, UNSPECIFIED TYPE: ICD-10-CM

## 2023-02-24 PROCEDURE — 99213 OFFICE O/P EST LOW 20 MIN: CPT | Performed by: PHYSICIAN ASSISTANT

## 2023-02-24 ASSESSMENT — ENCOUNTER SYMPTOMS
ORTHOPNEA: 0
SHORTNESS OF BREATH: 0
SORE THROAT: 1
SINUS PAIN: 1
WHEEZING: 0
HEADACHES: 0
FEVER: 0
COUGH: 0
TREMORS: 0
INSOMNIA: 0
CHILLS: 0
WEIGHT LOSS: 0
DIZZINESS: 0
ROS GI COMMENTS: NO DENTURES, NO DIFFICULTY SWALLOWING
PALPITATIONS: 0
HEARTBURN: 0
SPUTUM PRODUCTION: 0

## 2023-02-24 ASSESSMENT — FIBROSIS 4 INDEX: FIB4 SCORE: 1.24

## 2023-02-24 NOTE — PROGRESS NOTES
CC: First compliance, fatigue    HPI:  Sinai Hoover is a 73 y.o. year old female here today for follow-up on sleep apnea, first compliance on new device. Last seen in clinic 12/19/2022 by LIZBETH Ruffin.  She is a never smoker.    Patient previously reported daytime drowsiness, naps 30 to 60 minutes daily.  She is experiencing some morning headache.  She does have some sore throat.  She reports going to bed about 9:30 PM and waking up at 6:30 AM.  She is using a nasal pillow.  We will place orders for new mask and supplies with [facility].     Pertinent past medical history includes dizziness, hypothyroidism, anemia, left ear issues, abdominal pain.    Reviewed in clinic vitals including /72, HR 69, O2 sat 96% and BMI 21.13 kg per metered square.    Reviewed home medication regimen.    Reviewed most recent imaging including chest CT obtained 7/17/2018 demonstrating stable pulmonary nodules including 4 mm pleural-based right upper lobe lateral, 2.1 mm micronodule lateral right lower lobe, linear atelectasis or scarring left lung base, tiny calcified granuloma right lung apex.  No follow-up was recommended.    Polysomnogram obtained 12/8/2017 demonstrated mild to moderate JENNIFER H with AHI of 15.8 events per hour and low O2 sats of 82%.  Sleep fragmentation is noted, mild bradycardia noted.  Auto CPAP was initiated.    First compliance on her new ResMed auto CPAP of 5 to 15 cm H2O pressure dated 1/25/2023 through 2/23/2023 demonstrating 29 out of 30 days used or 97%, with 97% of days greater than or equal to 4 hours.  Average daily usage of 8 hours and 36 minutes.  Patient with AHI of 0.7, mild mask leak at 5.8 L/min at 95th percentile.  See media for full report.      Review of Systems   Constitutional:  Positive for malaise/fatigue. Negative for chills, fever and weight loss.   HENT:  Positive for sinus pain (resolves with NETI) and sore throat (occasional due to dryness (w/o cpap)). Negative for  congestion, hearing loss, nosebleeds and tinnitus.    Respiratory:  Negative for cough, sputum production, shortness of breath and wheezing.    Cardiovascular:  Negative for chest pain, palpitations, orthopnea and leg swelling.   Gastrointestinal:  Negative for heartburn.        No dentures, no difficulty swallowing    Neurological:  Negative for dizziness, tremors and headaches.   Psychiatric/Behavioral:  The patient does not have insomnia.      Past Medical History:   Diagnosis Date    Hypothyroidism     Sleep apnea 11/2019    Autopap       Past Surgical History:   Procedure Laterality Date    KNEE ARTHROSCOPY Right 11/26/2019    Procedure: ARTHROSCOPY, KNEE;  Surgeon: Lonny Gr M.D.;  Location: [facility];  Service: Orthopedics    MENISCECTOMY, KNEE, MEDIAL Right 11/26/2019    Procedure: MENISCECTOMY, KNEE, MEDIAL - FOR PARTIAL LATERAL;  Surgeon: Lonny Gr M.D.;  Location: [facility];  Service: Orthopedics    CHONDROPLASTY Right 11/26/2019    Procedure: CHONDROPLASTY;  Surgeon: Lonny Gr M.D.;  Location: [facility];  Service: Orthopedics    HIP REPLACEMENT, TOTAL Left 2016    MENISCECTOMY Left 1994    DILATION AND CURETTAGE  1992    COLONOSCOPY  2001, 2009    recheck age 64y    US-NEEDLE CORE BX-BREAST PANEL    2004 ?     left breast, benign       Family History   Problem Relation Age of Onset    Alcohol/Drug Mother     Heart Disease Father     Alcohol/Drug Brother        Social History     Socioeconomic History    Marital status:      Spouse name: Not on file    Number of children: Not on file    Years of education: Not on file    Highest education level: Not on file   Occupational History    Not on file   Tobacco Use    Smoking status: Never    Smokeless tobacco: Never   Vaping Use    Vaping Use: Never used   Substance and Sexual Activity    Alcohol use: Yes     Comment: OCC    Drug use: No    Sexual activity: Yes      "Partners: Male     Comment: , interior design, 3 daughters   Other Topics Concern    Not on file   Social History Narrative    Not on file     Social Determinants of Health     Financial Resource Strain: Not on file   Food Insecurity: Not on file   Transportation Needs: Not on file   Physical Activity: Not on file   Stress: Not on file   Social Connections: Not on file   Intimate Partner Violence: Not on file   Housing Stability: Not on file       Allergies as of 02/24/2023 - Reviewed 02/24/2023   Allergen Reaction Noted    Celebrex [celecoxib] Nausea 04/19/2017        @Vital signs for this encounter:  /72 (BP Location: Right arm, Patient Position: Sitting, BP Cuff Size: Adult)   Pulse 69   Resp 16   Ht 1.727 m (5' 8\")   Wt 63 kg (139 lb)   SpO2 96%     Current medications as of today   Current Outpatient Medications   Medication Sig Dispense Refill    levothyroxine (SYNTHROID) 88 MCG Tab TAKE 1 TABLET BY MOUTH IN THE MORNING ON AN EMPTY STOMACH 90 Tablet 2     No current facility-administered medications for this visit.         Physical Exam:   Gen:           Alert and oriented, No apparent distress. Mood and affect appropriate, normal interaction with provider.  Eyes:          sclere white, conjunctive moist.  Hearing:     Grossly intact.  Dentition:    Fair dentition.  Oropharynx:   Tongue normal, posterior pharynx without erythema or exudate.  Neck:        Supple, trachea midline, no masses.  Respiratory Effort: No intercostal retractions or use of accessory muscles.   Lung Auscultation:      Clear to auscultation bilaterally; no rales, rhonchi or wheezing.  CV:            Regular rate and rhythm. No edema. No murmurs, rubs or gallops.  Digits, Nails, Ext: No clubbing, cyanosis, petechiae, or nodes.   Skin:        No rashes, lesions or ulcers noted on exposed skin surfaces.                     Assessment:  1. JENNIFER on CPAP  DME Mask and Supplies      2. Fatigue, unspecified type      "       Immunizations:    Flu: Declined  Pneumovax 23: 11/13/2019  Prevnar 13: 11/6/2018  SARS-CoV-2 vaccine: Declined    Plan:  73-year-old female here for follow-up on sleep apnea, fatigue.  Patient is a never smoker.    JENNIFER: Managing well on CPAP, meeting therapeutic goals.  Reviewed equipment cleaning, using distilled water and humidifier, change supplies as recommended.  We will send order for mask and supplies to [facility].    Fatigue: she does report some throat dryness/sore throat she notes primarily after napping during the day without CPAP.  She is napping during day due to fatigue post exertion/working out.  Reviewed sleep hygiene, avoid daytime naps if possible as may be impacting quality of nighttime sleep.  Follow-up with primary for consideration of other causes.    Follow-up in 1 year, sooner if needed.    This dictation was created using voice recognition software. The accuracy of the dictation is limited to the abilities of the software. I expect there may be some errors of grammar and possibly content.

## 2023-02-24 NOTE — PATIENT INSTRUCTIONS
1-experiencing significant fatigue post exercise  2-follow up with primary provider   3-reviewed first sleep compliance on new device  4-no change in settings  5-no naps if possible   6-using distilled water  7-cleaning reviewed    Mild soap and water    Once a week  8-change supplies as requested

## 2023-08-17 ENCOUNTER — HOSPITAL ENCOUNTER (OUTPATIENT)
Dept: LAB | Facility: MEDICAL CENTER | Age: 73
End: 2023-08-17
Attending: FAMILY MEDICINE
Payer: MEDICARE

## 2023-08-17 ENCOUNTER — OFFICE VISIT (OUTPATIENT)
Dept: MEDICAL GROUP | Facility: MEDICAL CENTER | Age: 73
End: 2023-08-17
Payer: MEDICARE

## 2023-08-17 VITALS
DIASTOLIC BLOOD PRESSURE: 64 MMHG | RESPIRATION RATE: 16 BRPM | SYSTOLIC BLOOD PRESSURE: 114 MMHG | HEART RATE: 60 BPM | OXYGEN SATURATION: 96 % | TEMPERATURE: 97.9 F | BODY MASS INDEX: 21.02 KG/M2 | WEIGHT: 138.67 LBS | HEIGHT: 68 IN

## 2023-08-17 DIAGNOSIS — R53.83 OTHER FATIGUE: ICD-10-CM

## 2023-08-17 DIAGNOSIS — Z86.2 HISTORY OF ANEMIA: ICD-10-CM
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
## 2023-08-17 LAB
ALBUMIN SERPL BCP-MCNC: 4.6 G/DL (ref 3.2–4.9)
ALBUMIN/GLOB SERPL: 2.2 G/DL
ALP SERPL-CCNC: 68 U/L (ref 30–99)
ALT SERPL-CCNC: 14 U/L (ref 2–50)
ANION GAP SERPL CALC-SCNC: 12 MMOL/L (ref 7–16)
AST SERPL-CCNC: 18 U/L (ref 12–45)
BASOPHILS # BLD AUTO: 1 % (ref 0–1.8)
BASOPHILS # BLD: 0.04 K/UL (ref 0–0.12)
BILIRUB SERPL-MCNC: 0.3 MG/DL (ref 0.1–1.5)
BUN SERPL-MCNC: 20 MG/DL (ref 8–22)
CALCIUM ALBUM COR SERPL-MCNC: 9.1 MG/DL (ref 8.5–10.5)
CALCIUM SERPL-MCNC: 9.6 MG/DL (ref 8.5–10.5)
CHLORIDE SERPL-SCNC: 102 MMOL/L (ref 96–112)
CO2 SERPL-SCNC: 27 MMOL/L (ref 20–33)
CREAT SERPL-MCNC: 0.89 MG/DL (ref 0.5–1.4)
EOSINOPHIL # BLD AUTO: 0.06 K/UL (ref 0–0.51)
EOSINOPHIL NFR BLD: 1.5 % (ref 0–6.9)
ERYTHROCYTE [DISTWIDTH] IN BLOOD BY AUTOMATED COUNT: 42.3 FL (ref 35.9–50)
GFR SERPLBLD CREATININE-BSD FMLA CKD-EPI: 69 ML/MIN/1.73 M 2
GLOBULIN SER CALC-MCNC: 2.1 G/DL (ref 1.9–3.5)
GLUCOSE SERPL-MCNC: 97 MG/DL (ref 65–99)
HCT VFR BLD AUTO: 41.1 % (ref 37–47)
HGB BLD-MCNC: 13.5 G/DL (ref 12–16)
IMM GRANULOCYTES # BLD AUTO: 0 K/UL (ref 0–0.11)
IMM GRANULOCYTES NFR BLD AUTO: 0 % (ref 0–0.9)
IRON SATN MFR SERPL: 38 % (ref 15–55)
IRON SERPL-MCNC: 92 UG/DL (ref 40–170)
LYMPHOCYTES # BLD AUTO: 1.03 K/UL (ref 1–4.8)
LYMPHOCYTES NFR BLD: 26.5 % (ref 22–41)
MCH RBC QN AUTO: 29 PG (ref 27–33)
MCHC RBC AUTO-ENTMCNC: 32.8 G/DL (ref 32.2–35.5)
MCV RBC AUTO: 88.2 FL (ref 81.4–97.8)
MONOCYTES # BLD AUTO: 0.35 K/UL (ref 0–0.85)
MONOCYTES NFR BLD AUTO: 9 % (ref 0–13.4)
NEUTROPHILS # BLD AUTO: 2.4 K/UL (ref 1.82–7.42)
NEUTROPHILS NFR BLD: 62 % (ref 44–72)
NRBC # BLD AUTO: 0 K/UL
NRBC BLD-RTO: 0 /100 WBC (ref 0–0.2)
PLATELET # BLD AUTO: 182 K/UL (ref 164–446)
PMV BLD AUTO: 11.9 FL (ref 9–12.9)
POTASSIUM SERPL-SCNC: 4.1 MMOL/L (ref 3.6–5.5)
PROT SERPL-MCNC: 6.7 G/DL (ref 6–8.2)
RBC # BLD AUTO: 4.66 M/UL (ref 4.2–5.4)
SODIUM SERPL-SCNC: 141 MMOL/L (ref 135–145)
TIBC SERPL-MCNC: 245 UG/DL (ref 250–450)
TSH SERPL DL<=0.005 MIU/L-ACNC: 2.19 UIU/ML (ref 0.38–5.33)
UIBC SERPL-MCNC: 153 UG/DL (ref 110–370)
WBC # BLD AUTO: 3.9 K/UL (ref 4.8–10.8)

## 2023-08-17 PROCEDURE — 36415 COLL VENOUS BLD VENIPUNCTURE: CPT

## 2023-08-17 PROCEDURE — 99214 OFFICE O/P EST MOD 30 MIN: CPT | Mod: 95 | Performed by: FAMILY MEDICINE

## 2023-08-17 PROCEDURE — 83550 IRON BINDING TEST: CPT

## 2023-08-17 PROCEDURE — 84443 ASSAY THYROID STIM HORMONE: CPT

## 2023-08-17 PROCEDURE — 85025 COMPLETE CBC W/AUTO DIFF WBC: CPT

## 2023-08-17 PROCEDURE — 80053 COMPREHEN METABOLIC PANEL: CPT

## 2023-08-17 PROCEDURE — 3074F SYST BP LT 130 MM HG: CPT | Performed by: FAMILY MEDICINE

## 2023-08-17 PROCEDURE — 3078F DIAST BP <80 MM HG: CPT | Performed by: FAMILY MEDICINE

## 2023-08-17 PROCEDURE — 83540 ASSAY OF IRON: CPT

## 2023-08-17 ASSESSMENT — PATIENT HEALTH QUESTIONNAIRE - PHQ9: CLINICAL INTERPRETATION OF PHQ2 SCORE: 0

## 2023-08-17 ASSESSMENT — FIBROSIS 4 INDEX: FIB4 SCORE: 1.24

## 2023-08-17 NOTE — ASSESSMENT & PLAN NOTE
Feeling more fatigued than usual   Plan for labs including screen for anemia   See orders   Plan to schedule follow up to review results     Will also need to do a wellness exam labs prior in the spring    30+ min

## 2023-08-17 NOTE — PROGRESS NOTES
"Virtual Visit: Established Patient   This visit was conducted via Zoom using secure and encrypted videoconferencing technology.   The patient was in their home in the state [facility].    The patient's identity was confirmed and verbal consent was obtained for this virtual visit.     Subjective:   CC:   Chief Complaint   Patient presents with    Tired     Pt's been tired x 6mo but has worsened this summer.    Requesting Labs     Thyroid check       Sinai Hoover is a 73 y.o. female presenting for evaluation and management of:        Current medicines (including changes today)  Current Outpatient Medications   Medication Sig Dispense Refill    levothyroxine (SYNTHROID) 88 MCG Tab TAKE 1 TABLET BY MOUTH IN THE MORNING ON AN EMPTY STOMACH 90 Tablet 2     No current facility-administered medications for this visit.       Patient Active Problem List    Diagnosis Date Noted    Other fatigue 08/17/2023    Abnormal stools 08/11/2022    Intra-abdominal and pelvic swelling, mass and lump, unspecified site 08/11/2022    Diarrhea 07/12/2022    Medicare annual wellness visit, subsequent 11/18/2021    Dizziness 07/22/2020    Hearing loss of left ear 07/22/2020    Left ear pain 07/22/2020    Elevated blood pressure reading in office without diagnosis of hypertension 07/22/2020    Preop examination 11/13/2019    HSV infection 09/12/2019    History of anemia 09/12/2019    Wart on thumb 09/12/2019    JENNIFER on CPAP 02/14/2018    Hypothyroidism         Objective:   /64 (BP Location: Left arm, Patient Position: Sitting, BP Cuff Size: Adult long)   Pulse 60   Temp 36.6 °C (97.9 °F) (Temporal)   Resp 16   Ht 1.727 m (5' 8\")   Wt 62.9 kg (138 lb 10.7 oz)   SpO2 96%   BMI 21.08 kg/m²     Physical Exam:  Constitutional: Alert, no distress, well-groomed.  Skin: No rashes in visible areas.  Eye: Round. Conjunctiva clear, lids normal. No icterus.   ENMT: Lips pink without lesions, good dentition, moist mucous membranes. Phonation " normal.  Neck: No masses, no thyromegaly. Moves freely without pain.  Respiratory: Unlabored respiratory effort, no cough or audible wheeze  Psych: Alert and oriented x3, normal affect and mood.     Assessment and Plan:   The following treatment plan was discussed:     1. Other fatigue  - CBC WITH DIFFERENTIAL; Future  - IRON/TOTAL IRON BIND; Future  - TSH WITH REFLEX TO FT4; Future  - Comp Metabolic Panel; Future    2. History of anemia  - CBC WITH DIFFERENTIAL; Future  - IRON/TOTAL IRON BIND; Future    3. Moderate sun exposure, subsequent encounter  - Referral to Dermatology    Problem List Items Addressed This Visit       History of anemia    Relevant Orders    CBC WITH DIFFERENTIAL    IRON/TOTAL IRON BIND    Other fatigue     Feeling more fatigued than usual   Plan for labs including screen for anemia   See orders   Plan to schedule follow up to review results     Will also need to do a wellness exam labs prior in the spring    30+ min          Relevant Orders    CBC WITH DIFFERENTIAL    IRON/TOTAL IRON BIND    TSH WITH REFLEX TO FT4    Comp Metabolic Panel     Other Visit Diagnoses       Moderate sun exposure, subsequent encounter        Relevant Orders    Referral to Dermatology            Follow-up: No follow-ups on file.

## 2023-08-22 ENCOUNTER — OFFICE VISIT (OUTPATIENT)
Dept: MEDICAL GROUP | Facility: MEDICAL CENTER | Age: 73
End: 2023-08-22
Payer: MEDICARE

## 2023-08-22 VITALS
DIASTOLIC BLOOD PRESSURE: 68 MMHG | HEIGHT: 68 IN | WEIGHT: 137.8 LBS | OXYGEN SATURATION: 93 % | BODY MASS INDEX: 20.88 KG/M2 | HEART RATE: 64 BPM | SYSTOLIC BLOOD PRESSURE: 100 MMHG | TEMPERATURE: 96.9 F

## 2023-08-22 DIAGNOSIS — R53.83 OTHER FATIGUE: ICD-10-CM

## 2023-08-22 DIAGNOSIS — E03.9 ACQUIRED HYPOTHYROIDISM: ICD-10-CM

## 2023-08-22 DIAGNOSIS — E78.00 PURE HYPERCHOLESTEROLEMIA: ICD-10-CM

## 2023-08-22 DIAGNOSIS — Z86.39 HISTORY OF VITAMIN B DEFICIENCY: ICD-10-CM

## 2023-08-22 PROCEDURE — 3078F DIAST BP <80 MM HG: CPT | Performed by: STUDENT IN AN ORGANIZED HEALTH CARE EDUCATION/TRAINING PROGRAM

## 2023-08-22 PROCEDURE — 99214 OFFICE O/P EST MOD 30 MIN: CPT | Performed by: STUDENT IN AN ORGANIZED HEALTH CARE EDUCATION/TRAINING PROGRAM

## 2023-08-22 PROCEDURE — 3074F SYST BP LT 130 MM HG: CPT | Performed by: STUDENT IN AN ORGANIZED HEALTH CARE EDUCATION/TRAINING PROGRAM

## 2023-08-22 ASSESSMENT — FIBROSIS 4 INDEX: FIB4 SCORE: 1.9

## 2023-08-23 ENCOUNTER — HOSPITAL ENCOUNTER (OUTPATIENT)
Dept: LAB | Facility: MEDICAL CENTER | Age: 73
End: 2023-08-23
Attending: STUDENT IN AN ORGANIZED HEALTH CARE EDUCATION/TRAINING PROGRAM
Payer: MEDICARE

## 2023-08-23 DIAGNOSIS — R53.83 OTHER FATIGUE: ICD-10-CM

## 2023-08-23 DIAGNOSIS — Z86.39 HISTORY OF VITAMIN B DEFICIENCY: ICD-10-CM

## 2023-08-23 LAB — VIT B12 SERPL-MCNC: 287 PG/ML (ref 211–911)

## 2023-08-23 PROCEDURE — 36415 COLL VENOUS BLD VENIPUNCTURE: CPT

## 2023-08-23 PROCEDURE — 82607 VITAMIN B-12: CPT

## 2023-08-23 NOTE — PROGRESS NOTES
"Subjective:     CC: labs follow up and fatigue     HPI:   Sinai presents today to discuss lab results and chronic fatigue.    Reviewed labs   With decreased blood count 3.9 with differentials are within normal range.  No acute concerns  Hemoglobin 13.5, normal iron levels slightly low TIBC not concerning  Electrolytes, renal function liver function.  Lipid panel showed elevated .  Total cholesterol, triglyceride and HDL within normal range.  TSH 2.19, within normal range.      Current fatigue  Patient reports that she has this ongoing fatigue for many months.  Unclear etiology.  Denies any acute changes.  Wondering if this is normal aging process.  Patient reports that she was taking over-the-counter vitamin D supplements  For last couple of months she stopped taking multivitamin and vitamin D supplements.  Per chart review her B12 levels were low normal at 255 in 2019.        Health Maintenance: Completed    ROS:  ROS    Review of systems unremarkable except for concerns noted by patient or items listed.    Please see HPI for additional ROS.      Objective:     Exam:  /68 (BP Location: Left arm, Patient Position: Sitting, BP Cuff Size: Large adult)   Pulse 64   Temp 36.1 °C (96.9 °F) (Temporal)   Ht 1.727 m (5' 8\")   Wt 62.5 kg (137 lb 12.8 oz)   SpO2 93%   BMI 20.95 kg/m²  Body mass index is 20.95 kg/m².    Physical Exam  Constitutional:       Appearance: Normal appearance.   HENT:      Head: Normocephalic.      Mouth/Throat:      Mouth: Mucous membranes are moist.      Pharynx: Oropharynx is clear.   Eyes:      General: No scleral icterus.     Conjunctiva/sclera: Conjunctivae normal.   Cardiovascular:      Rate and Rhythm: Normal rate and regular rhythm.      Pulses: Normal pulses.      Heart sounds: Normal heart sounds.   Pulmonary:      Effort: Pulmonary effort is normal.      Breath sounds: Normal breath sounds.   Musculoskeletal:      Cervical back: Neck supple.      Right lower leg: No " edema.      Left lower leg: No edema.   Skin:     General: Skin is warm.      Capillary Refill: Capillary refill takes less than 2 seconds.   Neurological:      General: No focal deficit present.      Mental Status: She is alert and oriented to person, place, and time.   Psychiatric:         Mood and Affect: Mood normal.         Behavior: Behavior normal.             Labs: Reviewed    Assessment & Plan:     73 y.o. female with the following -       1. Other fatigue    Unclear etiology.  Labs show normal hemoglobin no anemia, normal TSH levels.  Chemistry panel within normal range.  Discussed with patient that he can check for vitamin D and B12 given that she has stopped taking supplements history of low normal B12 levels.    - VITAMIN D,25 HYDROXY (DEFICIENCY); Future  - VITAMIN B12; Future      3. Acquired hypothyroidism    Chronic, stable  TSH stays WNL  Plan  Continue Synthroid 88 mcg daily    4. Pure hypercholesterolemia    This is a new problem.  Previous lipid panel was within normal range ( 2019)   Mildly elevated  in new labs   Plan:  Recommended healthy diet and exercise.  Avoid processed foods.      Return in about 6 months (around 2/22/2024), or if symptoms worsen or fail to improve.    Please note that this dictation was created using voice recognition software. I have made every reasonable attempt to correct obvious errors, but I expect that there are errors of grammar and possibly content that I did not discover before finalizing the note.

## 2023-09-08 DIAGNOSIS — Z29.89 NEED FOR MALARIA PROPHYLAXIS: ICD-10-CM

## 2023-09-08 RX ORDER — MEFLOQUINE HYDROCHLORIDE 250 MG/1
250 TABLET ORAL
Qty: 10 TABLET | Refills: 0 | Status: SHIPPED | OUTPATIENT
Start: 2023-09-08

## 2023-11-20 RX ORDER — LEVOTHYROXINE SODIUM 88 UG/1
TABLET ORAL
Qty: 90 TABLET | Refills: 0 | Status: SHIPPED | OUTPATIENT
Start: 2023-11-20

## 2023-12-26 ENCOUNTER — HOSPITAL ENCOUNTER (OUTPATIENT)
Dept: RADIOLOGY | Facility: MEDICAL CENTER | Age: 73
End: 2023-12-26
Attending: FAMILY MEDICINE
Payer: MEDICARE

## 2023-12-26 DIAGNOSIS — Z12.31 VISIT FOR SCREENING MAMMOGRAM: ICD-10-CM

## 2023-12-26 PROCEDURE — 77063 BREAST TOMOSYNTHESIS BI: CPT

## 2024-12-27 ENCOUNTER — HOSPITAL ENCOUNTER (OUTPATIENT)
Dept: RADIOLOGY | Facility: MEDICAL CENTER | Age: 74
End: 2024-12-27
Attending: FAMILY MEDICINE
Payer: MEDICARE

## 2024-12-27 DIAGNOSIS — Z12.31 VISIT FOR SCREENING MAMMOGRAM: ICD-10-CM

## 2024-12-27 PROCEDURE — 77067 SCR MAMMO BI INCL CAD: CPT

## (undated) DEVICE — SHAVER4.0 AGGRESSIVE + FORMLA (5EA/BX)

## (undated) DEVICE — SUTURE GENERAL

## (undated) DEVICE — SUTURE 3-0 ETHILON FS-1 - (36/BX) 30 INCH

## (undated) DEVICE — BANDAGE ELASTIC 6 IN X 5 YDS - LATEX FREE (10/BX)

## (undated) DEVICE — SUCTION INSTRUMENT YANKAUER BULBOUS TIP W/O VENT (50EA/CA)

## (undated) DEVICE — PACK KNEE ARTHROSCOPY SM OR - (2EA/CA)

## (undated) DEVICE — ARTHROWAND TURBOVAC 3.5/90 SCT

## (undated) DEVICE — SENSOR SPO2 NEO LNCS ADHESIVE (20/BX) SEE USER NOTES

## (undated) DEVICE — GLOVE BIOGEL INDICATOR SZ 8 SURGICAL PF LTX - (50/BX 4BX/CA)

## (undated) DEVICE — DRESSING XEROFORM 1X8 - (50/BX 4BX/CA)

## (undated) DEVICE — MASK ANESTHESIA ADULT  - (100/CA)

## (undated) DEVICE — GLOVE, LITE (PAIR)

## (undated) DEVICE — CANISTER SUCTION RIGID RED 1500CC (40EA/CA)

## (undated) DEVICE — SPONGE GAUZESTER 4 X 4 4PLY - (128PK/CA)

## (undated) DEVICE — PADDING CAST 6 IN STERILE - 6 X 4 YDS (24/CA)

## (undated) DEVICE — HEAD HOLDER JUNIOR/ADULT

## (undated) DEVICE — CHLORAPREP 26 ML APPLICATOR - ORANGE TINT(25/CA)

## (undated) DEVICE — ELECTRODE 850 FOAM ADHESIVE - HYDROGEL RADIOTRNSPRNT (50/PK)

## (undated) DEVICE — GOWN SURGICAL X-LARGE ULTRA - FILM-REINFORCED (20/CA)

## (undated) DEVICE — TUBE CONNECTING SUCTION - CLEAR PLASTIC STERILE 72 IN (50EA/CA)

## (undated) DEVICE — GLOVE BIOGEL SZ 7.5 SURGICAL PF LTX - (50PR/BX 4BX/CA)

## (undated) DEVICE — DRAPE U ORTHOPEDIC - (10/BX)

## (undated) DEVICE — PROTECTOR ULNA NERVE - (36PR/CA)

## (undated) DEVICE — GOWN WARMING STANDARD FLEX - (30/CA)

## (undated) DEVICE — NEPTUNE 4 PORT MANIFOLD - (20/PK)

## (undated) DEVICE — SODIUM CHL. IRRIGATION 0.9% 3000ML (4EA/CA 65CA/PF)

## (undated) DEVICE — KIT ROOM DECONTAMINATION

## (undated) DEVICE — KIT ANESTHESIA W/CIRCUIT & 3/LT BAG W/FILTER (20EA/CA)

## (undated) DEVICE — HUMID-VENT HEAT AND MOISTURE EXCHANGE- (50/BX)